# Patient Record
Sex: FEMALE | Race: WHITE | Employment: OTHER | URBAN - METROPOLITAN AREA
[De-identification: names, ages, dates, MRNs, and addresses within clinical notes are randomized per-mention and may not be internally consistent; named-entity substitution may affect disease eponyms.]

---

## 2017-08-28 ENCOUNTER — OFFICE VISIT (OUTPATIENT)
Dept: CARDIOLOGY CLINIC | Age: 57
End: 2017-08-28

## 2017-08-28 VITALS
WEIGHT: 137 LBS | BODY MASS INDEX: 20.76 KG/M2 | HEIGHT: 68 IN | HEART RATE: 66 BPM | DIASTOLIC BLOOD PRESSURE: 82 MMHG | OXYGEN SATURATION: 96 % | SYSTOLIC BLOOD PRESSURE: 128 MMHG

## 2017-08-28 DIAGNOSIS — I10 ESSENTIAL HYPERTENSION: ICD-10-CM

## 2017-08-28 DIAGNOSIS — R94.31 ABNORMAL EKG: ICD-10-CM

## 2017-08-28 DIAGNOSIS — I25.9 CHEST PAIN DUE TO MYOCARDIAL ISCHEMIA, UNSPECIFIED ISCHEMIC CHEST PAIN TYPE: ICD-10-CM

## 2017-08-28 PROCEDURE — 99205 OFFICE O/P NEW HI 60 MIN: CPT | Performed by: INTERNAL MEDICINE

## 2017-08-28 PROCEDURE — 93000 ELECTROCARDIOGRAM COMPLETE: CPT | Performed by: INTERNAL MEDICINE

## 2017-08-28 RX ORDER — M-VIT,TX,IRON,MINS/CALC/FOLIC 27MG-0.4MG
1 TABLET ORAL DAILY
COMMUNITY
End: 2021-12-20

## 2017-08-28 RX ORDER — ALPRAZOLAM 0.25 MG/1
0.25 TABLET ORAL PRN
Status: ON HOLD | COMMUNITY
End: 2019-02-26

## 2017-08-28 ASSESSMENT — ENCOUNTER SYMPTOMS
WHEEZING: 0
EYE REDNESS: 0
COUGH: 0
BLOOD IN STOOL: 0
EYE PAIN: 0
ABDOMINAL PAIN: 0
SHORTNESS OF BREATH: 0
COLOR CHANGE: 0
CHEST TIGHTNESS: 0

## 2019-02-25 ENCOUNTER — HOSPITAL ENCOUNTER (OUTPATIENT)
Age: 59
Discharge: HOME | End: 2019-02-26
Attending: INTERNAL MEDICINE | Admitting: INTERNAL MEDICINE
Payer: COMMERCIAL

## 2019-02-25 PROBLEM — R07.9 CHEST PAIN: Status: ACTIVE | Noted: 2019-02-25

## 2019-02-25 LAB — TROPONIN: <0.01 NG/ML

## 2019-02-25 PROCEDURE — 6360000004 HC RX CONTRAST MEDICATION: Performed by: INTERNAL MEDICINE

## 2019-02-25 PROCEDURE — 99220 PR INITIAL OBSERVATION CARE/DAY 70 MINUTES: CPT | Performed by: INTERNAL MEDICINE

## 2019-02-25 PROCEDURE — 99152 MOD SED SAME PHYS/QHP 5/>YRS: CPT

## 2019-02-25 PROCEDURE — C1894 INTRO/SHEATH, NON-LASER: HCPCS

## 2019-02-25 PROCEDURE — 2709999900 HC NON-CHARGEABLE SUPPLY

## 2019-02-25 PROCEDURE — 93458 L HRT ARTERY/VENTRICLE ANGIO: CPT

## 2019-02-25 PROCEDURE — G0378 HOSPITAL OBSERVATION PER HR: HCPCS

## 2019-02-25 PROCEDURE — 84484 ASSAY OF TROPONIN QUANT: CPT

## 2019-02-25 PROCEDURE — 36415 COLL VENOUS BLD VENIPUNCTURE: CPT

## 2019-02-25 PROCEDURE — 6370000000 HC RX 637 (ALT 250 FOR IP): Performed by: INTERNAL MEDICINE

## 2019-02-25 PROCEDURE — C1769 GUIDE WIRE: HCPCS

## 2019-02-25 PROCEDURE — 2780000010 HC IMPLANT OTHER

## 2019-02-25 PROCEDURE — 99153 MOD SED SAME PHYS/QHP EA: CPT

## 2019-02-25 PROCEDURE — 85347 COAGULATION TIME ACTIVATED: CPT

## 2019-02-25 PROCEDURE — 93458 L HRT ARTERY/VENTRICLE ANGIO: CPT | Performed by: INTERNAL MEDICINE

## 2019-02-25 RX ORDER — HYDRALAZINE HYDROCHLORIDE 20 MG/ML
10 INJECTION INTRAMUSCULAR; INTRAVENOUS EVERY 10 MIN PRN
Status: DISCONTINUED | OUTPATIENT
Start: 2019-02-25 | End: 2019-02-26 | Stop reason: HOSPADM

## 2019-02-25 RX ORDER — ACETAMINOPHEN 325 MG/1
650 TABLET ORAL EVERY 4 HOURS PRN
Status: DISCONTINUED | OUTPATIENT
Start: 2019-02-25 | End: 2019-02-26 | Stop reason: HOSPADM

## 2019-02-25 RX ORDER — FLUTICASONE PROPIONATE 50 MCG
1 SPRAY, SUSPENSION (ML) NASAL DAILY
Status: DISCONTINUED | OUTPATIENT
Start: 2019-02-26 | End: 2019-02-26 | Stop reason: HOSPADM

## 2019-02-25 RX ORDER — ALPRAZOLAM 0.25 MG/1
0.25 TABLET ORAL PRN
Status: DISCONTINUED | OUTPATIENT
Start: 2019-02-25 | End: 2019-02-25 | Stop reason: SDUPTHER

## 2019-02-25 RX ORDER — ONDANSETRON 2 MG/ML
4 INJECTION INTRAMUSCULAR; INTRAVENOUS EVERY 6 HOURS PRN
Status: DISCONTINUED | OUTPATIENT
Start: 2019-02-25 | End: 2019-02-26 | Stop reason: HOSPADM

## 2019-02-25 RX ORDER — HYDROCODONE BITARTRATE AND ACETAMINOPHEN 5; 325 MG/1; MG/1
0.5 TABLET ORAL EVERY 6 HOURS PRN
Status: DISCONTINUED | OUTPATIENT
Start: 2019-02-25 | End: 2019-02-26 | Stop reason: HOSPADM

## 2019-02-25 RX ORDER — M-VIT,TX,IRON,MINS/CALC/FOLIC 27MG-0.4MG
1 TABLET ORAL DAILY
Status: DISCONTINUED | OUTPATIENT
Start: 2019-02-26 | End: 2019-02-26 | Stop reason: HOSPADM

## 2019-02-25 RX ORDER — LEVOTHYROXINE SODIUM 0.05 MG/1
50 TABLET ORAL DAILY
Status: DISCONTINUED | OUTPATIENT
Start: 2019-02-26 | End: 2019-02-26 | Stop reason: HOSPADM

## 2019-02-25 RX ORDER — CYCLOBENZAPRINE HCL 10 MG
10 TABLET ORAL NIGHTLY PRN
Status: DISCONTINUED | OUTPATIENT
Start: 2019-02-25 | End: 2019-02-26 | Stop reason: HOSPADM

## 2019-02-25 RX ORDER — DIAZEPAM 5 MG/1
5 TABLET ORAL 2 TIMES DAILY PRN
Status: DISCONTINUED | OUTPATIENT
Start: 2019-02-25 | End: 2019-02-26 | Stop reason: HOSPADM

## 2019-02-25 RX ORDER — ASPIRIN 81 MG/1
81 TABLET ORAL DAILY
Status: DISCONTINUED | OUTPATIENT
Start: 2019-02-26 | End: 2019-02-26 | Stop reason: HOSPADM

## 2019-02-25 RX ORDER — CARVEDILOL 6.25 MG/1
6.25 TABLET ORAL 2 TIMES DAILY WITH MEALS
Status: DISCONTINUED | OUTPATIENT
Start: 2019-02-25 | End: 2019-02-26 | Stop reason: HOSPADM

## 2019-02-25 RX ORDER — CETIRIZINE HYDROCHLORIDE 10 MG/1
10 TABLET ORAL DAILY
Status: DISCONTINUED | OUTPATIENT
Start: 2019-02-26 | End: 2019-02-26 | Stop reason: HOSPADM

## 2019-02-25 RX ADMIN — CARVEDILOL 6.25 MG: 6.25 TABLET, FILM COATED ORAL at 20:31

## 2019-02-25 RX ADMIN — IOPAMIDOL 54 ML: 755 INJECTION, SOLUTION INTRAVENOUS at 18:14

## 2019-02-25 RX ADMIN — CYCLOBENZAPRINE HYDROCHLORIDE 10 MG: 10 TABLET, FILM COATED ORAL at 20:32

## 2019-02-25 ASSESSMENT — PAIN SCALES - GENERAL: PAINLEVEL_OUTOF10: 0

## 2019-02-26 VITALS
BODY MASS INDEX: 21.08 KG/M2 | HEART RATE: 78 BPM | SYSTOLIC BLOOD PRESSURE: 157 MMHG | WEIGHT: 138.67 LBS | TEMPERATURE: 98.1 F | RESPIRATION RATE: 14 BRPM | OXYGEN SATURATION: 99 % | DIASTOLIC BLOOD PRESSURE: 92 MMHG

## 2019-02-26 PROBLEM — R07.89 OTHER CHEST PAIN: Status: ACTIVE | Noted: 2019-02-25

## 2019-02-26 LAB
A/G RATIO: 1.6 (ref 1.1–2.2)
ALBUMIN SERPL-MCNC: 4.1 G/DL (ref 3.4–5)
ALP BLD-CCNC: 36 U/L (ref 40–129)
ALT SERPL-CCNC: 20 U/L (ref 10–40)
ANION GAP SERPL CALCULATED.3IONS-SCNC: 13 MMOL/L (ref 3–16)
AST SERPL-CCNC: 26 U/L (ref 15–37)
BILIRUB SERPL-MCNC: 0.3 MG/DL (ref 0–1)
BUN BLDV-MCNC: 12 MG/DL (ref 7–20)
CALCIUM SERPL-MCNC: 9.2 MG/DL (ref 8.3–10.6)
CHLORIDE BLD-SCNC: 100 MMOL/L (ref 99–110)
CHOLESTEROL, TOTAL: 233 MG/DL (ref 0–199)
CO2: 26 MMOL/L (ref 21–32)
CREAT SERPL-MCNC: 0.6 MG/DL (ref 0.6–1.1)
GFR AFRICAN AMERICAN: >60
GFR NON-AFRICAN AMERICAN: >60
GLOBULIN: 2.6 G/DL
GLUCOSE BLD-MCNC: 98 MG/DL (ref 70–99)
HCT VFR BLD CALC: 40.2 % (ref 36–48)
HDLC SERPL-MCNC: 110 MG/DL (ref 40–60)
HEMOGLOBIN: 13.6 G/DL (ref 12–16)
LDL CHOLESTEROL CALCULATED: 111 MG/DL
LV EF: 58 %
LVEF MODALITY: NORMAL
MAGNESIUM: 2.2 MG/DL (ref 1.8–2.4)
MCH RBC QN AUTO: 32.4 PG (ref 26–34)
MCHC RBC AUTO-ENTMCNC: 33.9 G/DL (ref 31–36)
MCV RBC AUTO: 95.7 FL (ref 80–100)
PDW BLD-RTO: 13.3 % (ref 12.4–15.4)
PLATELET # BLD: 220 K/UL (ref 135–450)
PMV BLD AUTO: 8.3 FL (ref 5–10.5)
POTASSIUM REFLEX MAGNESIUM: 4 MMOL/L (ref 3.5–5.1)
RBC # BLD: 4.2 M/UL (ref 4–5.2)
SODIUM BLD-SCNC: 139 MMOL/L (ref 136–145)
TOTAL PROTEIN: 6.7 G/DL (ref 6.4–8.2)
TRIGL SERPL-MCNC: 61 MG/DL (ref 0–150)
TROPONIN: <0.01 NG/ML
VLDLC SERPL CALC-MCNC: 12 MG/DL
WBC # BLD: 6.2 K/UL (ref 4–11)

## 2019-02-26 PROCEDURE — 6370000000 HC RX 637 (ALT 250 FOR IP): Performed by: INTERNAL MEDICINE

## 2019-02-26 PROCEDURE — 80053 COMPREHEN METABOLIC PANEL: CPT

## 2019-02-26 PROCEDURE — 84484 ASSAY OF TROPONIN QUANT: CPT

## 2019-02-26 PROCEDURE — 36415 COLL VENOUS BLD VENIPUNCTURE: CPT

## 2019-02-26 PROCEDURE — 80061 LIPID PANEL: CPT

## 2019-02-26 PROCEDURE — 85027 COMPLETE CBC AUTOMATED: CPT

## 2019-02-26 PROCEDURE — 83735 ASSAY OF MAGNESIUM: CPT

## 2019-02-26 PROCEDURE — C8923 2D TTE W OR W/O FOL W/CON,CO: HCPCS

## 2019-02-26 PROCEDURE — 94760 N-INVAS EAR/PLS OXIMETRY 1: CPT

## 2019-02-26 PROCEDURE — G0378 HOSPITAL OBSERVATION PER HR: HCPCS

## 2019-02-26 PROCEDURE — 99239 HOSP IP/OBS DSCHRG MGMT >30: CPT | Performed by: INTERNAL MEDICINE

## 2019-02-26 RX ORDER — AMOXICILLIN AND CLAVULANATE POTASSIUM 875; 125 MG/1; MG/1
1 TABLET, FILM COATED ORAL 2 TIMES DAILY
COMMUNITY
Start: 2019-02-24 | End: 2019-03-06

## 2019-02-26 RX ORDER — CARVEDILOL 6.25 MG/1
6.25 TABLET ORAL 2 TIMES DAILY WITH MEALS
Qty: 60 TABLET | Refills: 3 | Status: SHIPPED | OUTPATIENT
Start: 2019-02-26 | End: 2019-03-07 | Stop reason: SDUPTHER

## 2019-02-26 RX ADMIN — CETIRIZINE HYDROCHLORIDE 10 MG: 10 TABLET, FILM COATED ORAL at 09:51

## 2019-02-26 RX ADMIN — LEVOTHYROXINE SODIUM 50 MCG: 50 TABLET ORAL at 09:51

## 2019-02-26 RX ADMIN — CARVEDILOL 6.25 MG: 6.25 TABLET, FILM COATED ORAL at 09:51

## 2019-02-26 RX ADMIN — MULTIPLE VITAMINS W/ MINERALS TAB 1 TABLET: TAB at 09:51

## 2019-02-26 RX ADMIN — ASPIRIN 81 MG: 81 TABLET, COATED ORAL at 09:51

## 2019-02-26 RX ADMIN — ACETAMINOPHEN 650 MG: 325 TABLET, FILM COATED ORAL at 00:29

## 2019-02-26 ASSESSMENT — PAIN SCALES - GENERAL
PAINLEVEL_OUTOF10: 0
PAINLEVEL_OUTOF10: 10

## 2019-03-01 LAB — POC ACT LR: 204 SEC

## 2019-03-01 ASSESSMENT — ENCOUNTER SYMPTOMS
EYE PAIN: 0
SHORTNESS OF BREATH: 0
EYE REDNESS: 0
COLOR CHANGE: 0
ABDOMINAL PAIN: 0
COUGH: 0
BLOOD IN STOOL: 0
CHEST TIGHTNESS: 0
WHEEZING: 0

## 2019-03-07 ENCOUNTER — OFFICE VISIT (OUTPATIENT)
Dept: CARDIOLOGY CLINIC | Age: 59
End: 2019-03-07
Payer: COMMERCIAL

## 2019-03-07 VITALS
HEART RATE: 76 BPM | SYSTOLIC BLOOD PRESSURE: 140 MMHG | OXYGEN SATURATION: 99 % | DIASTOLIC BLOOD PRESSURE: 100 MMHG | WEIGHT: 139.4 LBS | BODY MASS INDEX: 21.13 KG/M2 | HEIGHT: 68 IN

## 2019-03-07 DIAGNOSIS — I10 ESSENTIAL HYPERTENSION: ICD-10-CM

## 2019-03-07 DIAGNOSIS — R94.31 ABNORMAL EKG: ICD-10-CM

## 2019-03-07 DIAGNOSIS — R07.89 OTHER CHEST PAIN: Primary | ICD-10-CM

## 2019-03-07 PROCEDURE — 99214 OFFICE O/P EST MOD 30 MIN: CPT | Performed by: INTERNAL MEDICINE

## 2019-03-07 RX ORDER — CARVEDILOL 12.5 MG/1
12.5 TABLET ORAL 2 TIMES DAILY WITH MEALS
Qty: 180 TABLET | Refills: 3 | Status: SHIPPED | OUTPATIENT
Start: 2019-03-07 | End: 2019-03-11 | Stop reason: SDUPTHER

## 2019-03-11 RX ORDER — CARVEDILOL 12.5 MG/1
12.5 TABLET ORAL 2 TIMES DAILY WITH MEALS
Qty: 180 TABLET | Refills: 3 | Status: SHIPPED | OUTPATIENT
Start: 2019-03-11 | End: 2020-02-20

## 2020-02-07 ENCOUNTER — HOSPITAL ENCOUNTER (OUTPATIENT)
Age: 60
Setting detail: OUTPATIENT SURGERY
Discharge: HOME | End: 2020-02-07
Attending: INTERNAL MEDICINE | Admitting: INTERNAL MEDICINE
Payer: COMMERCIAL

## 2020-02-07 VITALS
DIASTOLIC BLOOD PRESSURE: 62 MMHG | RESPIRATION RATE: 18 BRPM | BODY MASS INDEX: 21.22 KG/M2 | SYSTOLIC BLOOD PRESSURE: 106 MMHG | HEART RATE: 74 BPM | WEIGHT: 140 LBS | OXYGEN SATURATION: 97 % | HEIGHT: 68 IN | TEMPERATURE: 97.3 F

## 2020-02-07 PROCEDURE — 6370000000 HC RX 637 (ALT 250 FOR IP): Performed by: INTERNAL MEDICINE

## 2020-02-07 PROCEDURE — 88305 TISSUE EXAM BY PATHOLOGIST: CPT

## 2020-02-07 PROCEDURE — 7100000011 HC PHASE II RECOVERY - ADDTL 15 MIN: Performed by: INTERNAL MEDICINE

## 2020-02-07 PROCEDURE — 99152 MOD SED SAME PHYS/QHP 5/>YRS: CPT | Performed by: INTERNAL MEDICINE

## 2020-02-07 PROCEDURE — 3609010300 HC COLONOSCOPY W/BIOPSY SINGLE/MULTIPLE: Performed by: INTERNAL MEDICINE

## 2020-02-07 PROCEDURE — 2709999900 HC NON-CHARGEABLE SUPPLY: Performed by: INTERNAL MEDICINE

## 2020-02-07 PROCEDURE — 6360000002 HC RX W HCPCS: Performed by: INTERNAL MEDICINE

## 2020-02-07 PROCEDURE — 7100000010 HC PHASE II RECOVERY - FIRST 15 MIN: Performed by: INTERNAL MEDICINE

## 2020-02-07 PROCEDURE — 3609012400 HC EGD TRANSORAL BIOPSY SINGLE/MULTIPLE: Performed by: INTERNAL MEDICINE

## 2020-02-07 PROCEDURE — 99153 MOD SED SAME PHYS/QHP EA: CPT | Performed by: INTERNAL MEDICINE

## 2020-02-07 PROCEDURE — 2500000003 HC RX 250 WO HCPCS: Performed by: INTERNAL MEDICINE

## 2020-02-07 PROCEDURE — 2580000003 HC RX 258: Performed by: INTERNAL MEDICINE

## 2020-02-07 RX ORDER — MEPERIDINE HYDROCHLORIDE 25 MG/ML
INJECTION INTRAMUSCULAR; INTRAVENOUS; SUBCUTANEOUS PRN
Status: DISCONTINUED | OUTPATIENT
Start: 2020-02-07 | End: 2020-02-07 | Stop reason: ALTCHOICE

## 2020-02-07 RX ORDER — MEPERIDINE HYDROCHLORIDE 50 MG/ML
INJECTION INTRAMUSCULAR; INTRAVENOUS; SUBCUTANEOUS PRN
Status: DISCONTINUED | OUTPATIENT
Start: 2020-02-07 | End: 2020-02-07 | Stop reason: ALTCHOICE

## 2020-02-07 RX ORDER — MIDAZOLAM HYDROCHLORIDE 1 MG/ML
INJECTION INTRAMUSCULAR; INTRAVENOUS PRN
Status: DISCONTINUED | OUTPATIENT
Start: 2020-02-07 | End: 2020-02-07 | Stop reason: ALTCHOICE

## 2020-02-07 RX ORDER — 0.9 % SODIUM CHLORIDE 0.9 %
500 INTRAVENOUS SOLUTION INTRAVENOUS ONCE
Status: COMPLETED | OUTPATIENT
Start: 2020-02-07 | End: 2020-02-07

## 2020-02-07 RX ADMIN — SODIUM CHLORIDE 500 ML: 9 INJECTION, SOLUTION INTRAVENOUS at 06:56

## 2020-02-07 ASSESSMENT — PAIN SCALES - WONG BAKER
WONGBAKER_NUMERICALRESPONSE: 0
WONGBAKER_NUMERICALRESPONSE: 4
WONGBAKER_NUMERICALRESPONSE: 0
WONGBAKER_NUMERICALRESPONSE: 4
WONGBAKER_NUMERICALRESPONSE: 2
WONGBAKER_NUMERICALRESPONSE: 0

## 2020-02-07 ASSESSMENT — PAIN - FUNCTIONAL ASSESSMENT: PAIN_FUNCTIONAL_ASSESSMENT: 0-10

## 2020-02-07 NOTE — OP NOTE
[address]               [address] [place], [address]                                OPERATIVE REPORT    PATIENT NAME: Pernell Anderson                     :        1960  MED REC NO:   [id number]                          ROOM:  ACCOUNT NO:   [de-identified]                           ADMIT DATE: 2020  PROVIDER:     Flory Velazquez MD    DATE OF PROCEDURE:  2020    INDICATIONS FOR PROCEDURE:  1. For evaluation of gastroesophageal reflux disease in this  69-year-old woman who complained of intermittent heart burn, to rule out  Turpin's esophagus. 2.  History of polyps. 3.  Family history of colon cancer and uterus cancer. SURGEON:  Flory Velazquez MD    DESCRIPTION OF PROCEDURE:  EGD:  With the patient in the left lateral position and after 25 mg of  Demerol, 11 mg of Versed and 25 mg of Benadryl IV, the Olympus video  endoscope was introduced into the esophagus and advanced towards the GE  junction. The esophagus was normal.  Stomach was carefully inspected. It was normal.  Biopsies were obtained for Helicobacter pylori. The  duodenum was normal.  Scope was then removed without complications. COLONOSCOPY:  The Olympus video colonoscope was then inserted into the  rectum and advanced throughout the redundant colon until we reached the  point that the patient could not tolerate the procedure despite adequate  sedation. The scope at this point was withdrawn and colon was  inspected, upon the withdrawal of the scope. No abnormality except for  small polyp noted in the sigmoid, measured about 0.75 cm. We removed it  with the biopsy forceps technique. The scope was then removed without  complications. We believe that we reached the proximal transverse colon  in this procedure. The patient was sent to recovery room in  satisfactory condition. IMPRESSION:  1. Normal esophagogastroduodenoscopy. 2.  Sigmoid polyp.   3.  The patient should have another colonoscopy with MAC anesthesia. EBL none    Thank you, Dr. Khanh Byrne. Damian Yuen MD    D: 02/07/2020 8:34:16       T: 02/07/2020 9:13:08     [email or web address]  Job#: [id number]     Doc#: [id number]    CC:   Gordon Flores MD

## 2020-02-20 RX ORDER — CARVEDILOL 12.5 MG/1
TABLET ORAL
Qty: 180 TABLET | Refills: 4 | Status: SHIPPED | OUTPATIENT
Start: 2020-02-20 | End: 2021-02-22

## 2020-02-25 ENCOUNTER — ANESTHESIA (OUTPATIENT)
Dept: ENDOSCOPY | Age: 60
End: 2020-02-25
Payer: COMMERCIAL

## 2020-02-25 ENCOUNTER — ANESTHESIA EVENT (OUTPATIENT)
Dept: ENDOSCOPY | Age: 60
End: 2020-02-25
Payer: COMMERCIAL

## 2020-02-25 ENCOUNTER — HOSPITAL ENCOUNTER (OUTPATIENT)
Age: 60
Setting detail: OUTPATIENT SURGERY
Discharge: HOME | End: 2020-02-25
Attending: INTERNAL MEDICINE | Admitting: INTERNAL MEDICINE
Payer: COMMERCIAL

## 2020-02-25 VITALS
HEIGHT: 68 IN | OXYGEN SATURATION: 100 % | HEART RATE: 66 BPM | DIASTOLIC BLOOD PRESSURE: 92 MMHG | WEIGHT: 140 LBS | RESPIRATION RATE: 16 BRPM | TEMPERATURE: 97.5 F | SYSTOLIC BLOOD PRESSURE: 128 MMHG | BODY MASS INDEX: 21.22 KG/M2

## 2020-02-25 VITALS — DIASTOLIC BLOOD PRESSURE: 66 MMHG | SYSTOLIC BLOOD PRESSURE: 102 MMHG | OXYGEN SATURATION: 99 %

## 2020-02-25 PROCEDURE — 2500000003 HC RX 250 WO HCPCS: Performed by: INTERNAL MEDICINE

## 2020-02-25 PROCEDURE — 2580000003 HC RX 258: Performed by: INTERNAL MEDICINE

## 2020-02-25 PROCEDURE — 88305 TISSUE EXAM BY PATHOLOGIST: CPT

## 2020-02-25 PROCEDURE — 2709999900 HC NON-CHARGEABLE SUPPLY: Performed by: INTERNAL MEDICINE

## 2020-02-25 PROCEDURE — 7100000011 HC PHASE II RECOVERY - ADDTL 15 MIN: Performed by: INTERNAL MEDICINE

## 2020-02-25 PROCEDURE — 7100000010 HC PHASE II RECOVERY - FIRST 15 MIN: Performed by: INTERNAL MEDICINE

## 2020-02-25 PROCEDURE — 3609010600 HC COLONOSCOPY POLYPECTOMY SNARE/COLD BIOPSY: Performed by: INTERNAL MEDICINE

## 2020-02-25 PROCEDURE — 3700000000 HC ANESTHESIA ATTENDED CARE: Performed by: INTERNAL MEDICINE

## 2020-02-25 PROCEDURE — 6360000002 HC RX W HCPCS: Performed by: NURSE ANESTHETIST, CERTIFIED REGISTERED

## 2020-02-25 PROCEDURE — 3700000001 HC ADD 15 MINUTES (ANESTHESIA): Performed by: INTERNAL MEDICINE

## 2020-02-25 RX ORDER — SODIUM CHLORIDE, SODIUM LACTATE, POTASSIUM CHLORIDE, CALCIUM CHLORIDE 600; 310; 30; 20 MG/100ML; MG/100ML; MG/100ML; MG/100ML
INJECTION, SOLUTION INTRAVENOUS CONTINUOUS
Status: DISCONTINUED | OUTPATIENT
Start: 2020-02-25 | End: 2020-02-25 | Stop reason: HOSPADM

## 2020-02-25 RX ORDER — LIDOCAINE HYDROCHLORIDE 20 MG/ML
INJECTION, SOLUTION INTRAVENOUS PRN
Status: DISCONTINUED | OUTPATIENT
Start: 2020-02-25 | End: 2020-02-25 | Stop reason: SDUPTHER

## 2020-02-25 RX ORDER — PROPOFOL 10 MG/ML
INJECTION, EMULSION INTRAVENOUS PRN
Status: DISCONTINUED | OUTPATIENT
Start: 2020-02-25 | End: 2020-02-25 | Stop reason: SDUPTHER

## 2020-02-25 RX ORDER — SODIUM CHLORIDE 9 MG/ML
INJECTION, SOLUTION INTRAVENOUS CONTINUOUS
Status: DISCONTINUED | OUTPATIENT
Start: 2020-02-25 | End: 2020-02-25 | Stop reason: HOSPADM

## 2020-02-25 RX ADMIN — LIDOCAINE HYDROCHLORIDE 100 MG: 20 INJECTION, SOLUTION INTRAVENOUS at 09:36

## 2020-02-25 RX ADMIN — SODIUM CHLORIDE, POTASSIUM CHLORIDE, SODIUM LACTATE AND CALCIUM CHLORIDE: 600; 310; 30; 20 INJECTION, SOLUTION INTRAVENOUS at 07:42

## 2020-02-25 RX ADMIN — PROPOFOL 100 MG: 10 INJECTION, EMULSION INTRAVENOUS at 09:57

## 2020-02-25 RX ADMIN — PROPOFOL 100 MG: 10 INJECTION, EMULSION INTRAVENOUS at 09:38

## 2020-02-25 RX ADMIN — PROPOFOL 100 MG: 10 INJECTION, EMULSION INTRAVENOUS at 09:36

## 2020-02-25 RX ADMIN — PROPOFOL 100 MG: 10 INJECTION, EMULSION INTRAVENOUS at 09:50

## 2020-02-25 RX ADMIN — SODIUM CHLORIDE: 9 INJECTION, SOLUTION INTRAVENOUS at 10:11

## 2020-02-25 RX ADMIN — PROPOFOL 50 MG: 10 INJECTION, EMULSION INTRAVENOUS at 10:03

## 2020-02-25 RX ADMIN — PROPOFOL 100 MG: 10 INJECTION, EMULSION INTRAVENOUS at 09:41

## 2020-02-25 ASSESSMENT — PULMONARY FUNCTION TESTS
PIF_VALUE: 0
PIF_VALUE: 1
PIF_VALUE: 0

## 2020-02-25 ASSESSMENT — PAIN DESCRIPTION - PAIN TYPE: TYPE: ACUTE PAIN

## 2020-02-25 ASSESSMENT — PAIN - FUNCTIONAL ASSESSMENT
PAIN_FUNCTIONAL_ASSESSMENT: 0-10

## 2020-02-25 ASSESSMENT — PAIN DESCRIPTION - DESCRIPTORS: DESCRIPTORS: ACHING

## 2020-02-25 ASSESSMENT — PAIN DESCRIPTION - LOCATION: LOCATION: HEAD

## 2020-02-25 ASSESSMENT — LIFESTYLE VARIABLES: SMOKING_STATUS: 0

## 2020-02-25 ASSESSMENT — PAIN DESCRIPTION - ORIENTATION: ORIENTATION: ANTERIOR;OTHER (COMMENT)

## 2020-02-25 ASSESSMENT — PAIN SCALES - GENERAL: PAINLEVEL_OUTOF10: 4

## 2020-02-25 NOTE — H&P
History and Physical / Pre-Sedation Assessment    Patient:  Bud Evans   :   1960     Intended Procedure:  colonoscopy    HPI: h/o colon polyps. Fh of colon cancer and uterus cancer. She did not tolerate last colonoscopy . Today she is having colonoscopy with Mac anesthesia    Past Medical History:   has a past medical history of Anxiety, Fibromyalgia, Fibromyalgia, Hypertension, Hypotension, Hypothyroidism, and Irregular heart rate. Past Surgical History:   has a past surgical history that includes Tubal ligation; Ovarian cyst surgery; Tonsillectomy ([id number]); Cardiac catheterization (); Upper gastrointestinal endoscopy (N/A, 2020); and Colonoscopy (N/A, 2020). Medications:  Prior to Admission medications    Medication Sig Start Date End Date Taking? Authorizing Provider   carvedilol (COREG) 12.5 MG tablet TAKE 1 TABLET TWICE A DAY WITH MEALS 20  Yes Humberto Mar MD   Omega-3 Fatty Acids (FISH OIL CONCENTRATE PO) Take by mouth   Yes Historical Provider, MD   Multiple Vitamins-Minerals (THERAPEUTIC MULTIVITAMIN-MINERALS) tablet Take 1 tablet by mouth daily   Yes Historical Provider, MD   Ascorbic Acid (VITAMIN C IMMUNE HEALTH PO) Take by mouth   Yes Historical Provider, MD   Coenzyme Q10 (COQ10 PO) Take by mouth   Yes Historical Provider, MD   levothyroxine (SYNTHROID) 50 MCG tablet Take 50 mcg by mouth Daily   Yes Historical Provider, MD   cyclobenzaprine (FLEXERIL) 10 MG tablet Take 10 mg by mouth nightly as needed for Muscle spasms   Yes Historical Provider, MD   HYDROcodone-acetaminophen (NORCO) 5-325 MG per tablet Take 0.5 tablets by mouth as needed for Pain  .    Yes Historical Provider, MD   diazepam (VALIUM) 5 MG tablet Take 5 mg by mouth as needed for Anxiety   Yes Historical Provider, MD   fluticasone (FLONASE) 50 MCG/ACT nasal spray 1 spray by Nasal route daily   Yes Historical Provider, MD   cetirizine (ZYRTEC) 10 MG tablet Take 10 mg by mouth daily   Yes Historical Provider, MD       Family History:  family history includes Dementia in her mother; High Blood Pressure in her mother. Social History:   reports that she has never smoked. She has never used smokeless tobacco. She reports current alcohol use. She reports that she does not use drugs. Allergies:  Cefdinir; Food; Tylenol with codeine #3 [acetaminophen-codeine]; and Codeine    ROS:  twelve point system review was unremarkable except for above noted history. Nurses notes reviewed and agreed. Medications reviewed    Physical Exam:  Vital Signs: /78   Pulse 62   Temp 97.5 °F (36.4 °C) (Oral)   Resp 16   Ht 5' 8\" (1.727 m)   Wt 140 lb (63.5 kg)   SpO2 100%   BMI 21.29 kg/m²    Skin: normal  HEENT: normal  Neck: supple. No adenopathy. No thyromegaly. No JVD. Pulmonary:Normal  Cardiac:Normal  Abdomen:Normal  MS: normal  Neuro: normal  Ext: no edema. Pulses normal    Pre-Procedure Assessment / Plan:  ASA 2 - Patient with mild systemic disease with no functional limitations  Mallampati Airway Assessment:  Mallampati Class I - (soft palate, fauces, uvula & anterior/posterior tonsillar pillars are visible)  Level of Sedation Plan: Moderate sedation  Post Procedure plan: Return to same level of care    I assessed the patient and find that the patient is in satisfactory condition to proceed with the planned procedure and sedation plan. I have explained the risk, benefits, and alternatives to the procedure. The patient understands and agrees to proceed.   Shaista Bess  9:08 AM 2/25/2020

## 2020-02-25 NOTE — ANESTHESIA PRE PROCEDURE
(See Comments)    Food      avacodos    Tylenol With Codeine #3 [Acetaminophen-Codeine] Other (See Comments)     hallicunations    Codeine Other (See Comments)     HALLUCINATIONS  HALLUCINATIONS         Problem List:    Patient Active Problem List   Diagnosis Code    Chest pain due to myocardial ischemia I25.9    Essential hypertension I10    Abnormal EKG R94.31    Other chest pain R07.89       Past Medical History:        Diagnosis Date    Anxiety     Fibromyalgia     Fibromyalgia     Hypertension     Hypotension     Hypothyroidism     Irregular heart rate        Past Surgical History:        Procedure Laterality Date    CARDIAC CATHETERIZATION  2019    COLONOSCOPY N/A 2/7/2020    COLONOSCOPY WITH BIOPSY performed by Monico Pleitez MD at [address] [facility]  [id number]    adenoidectomy    TUBAL LIGATION      UPPER GASTROINTESTINAL ENDOSCOPY N/A 2/7/2020    EGD BIOPSY performed by Monico Pleitez MD at [address] [facility] History:    Social History     Tobacco Use    Smoking status: Never Smoker    Smokeless tobacco: Never Used   Substance Use Topics    Alcohol use: Yes     Comment: occasional                                Counseling given: Not Answered      Vital Signs (Current):   Vitals:    02/25/20 0723   BP: 137/78   Pulse: 62   Resp: 16   Temp: 97.5 °F (36.4 °C)   TempSrc: Oral   SpO2: 100%   Weight: 140 lb (63.5 kg)   Height: 5' 8\" (1.727 m)                                              BP Readings from Last 3 Encounters:   02/25/20 137/78   02/07/20 106/62   03/07/19 (!) 140/100       NPO Status: Time of last liquid consumption: 2300                        Time of last solid consumption: 2100                        Date of last liquid consumption: 02/24/20                        Date of last solid food consumption: 02/23/20    BMI:   Wt Readings from Last 3 Encounters:   02/25/20 140 lb (63.5 kg)   02/07/20 140 lb (63.5 kg)   03/07/19 139 lb 6.4

## 2020-02-25 NOTE — ANESTHESIA POSTPROCEDURE EVALUATION
Department of Anesthesiology  Postprocedure Note    Patient: Paul Max  MRN: [id number]  YOB: 1960  Date of evaluation: 2/25/2020  Time:  11:26 AM     Procedure Summary     Date:  02/25/20 Room / Location:  [facility] [address] / [facility]; [place] [address]    Anesthesia Start:  0932 Anesthesia Stop:  1011    Procedures:       COLONOSCOPY PROCEDURE      COLONOSCOPY POLYPECTOMY SNARE/COLD BIOPSY Diagnosis:  (Screening)    Scheduled Providers: Wilfredo Rodríguez MD Responsible Provider:  Obi Naylor DO    Anesthesia Type:  MAC ASA Status:  2          Anesthesia Type: MAC    Roberto Carlos Phase I: Roberto Carlos Score: 10    Roberto Carlos Phase II: Roberto Carlos Score: 10    Last vitals: Reviewed and per EMR flowsheets.        Anesthesia Post Evaluation    Patient location during evaluation: PACU  Patient participation: complete - patient participated  Level of consciousness: awake and alert  Pain score: 0  Airway patency: patent  Nausea & Vomiting: no nausea and no vomiting  Complications: no  Cardiovascular status: hemodynamically stable  Respiratory status: acceptable  Hydration status: stable

## 2020-11-29 NOTE — PROGRESS NOTES
Ambulatory Surgery/Procedure Discharge Note    Vitals:    02/25/20 1052   BP: (!) 128/92   Pulse: 66   Resp:    Temp:    SpO2: 100%       In: 1630 [P.O.:480; I.V.:1150]  Out: -     Restroom use offered before discharge. Yes    Pain assessment:  none  Pain Level: 4        Patient discharged to home/self care.  Patient discharged via wheel chair by transporter to waiting family/S.O.       2/25/2020 10:54 AM
home w/ home PT

## 2021-02-12 ENCOUNTER — APPOINTMENT (OUTPATIENT)
Dept: GENERAL RADIOLOGY | Age: 61
DRG: 310 | End: 2021-02-12
Payer: COMMERCIAL

## 2021-02-12 ENCOUNTER — HOSPITAL ENCOUNTER (INPATIENT)
Age: 61
LOS: 2 days | Discharge: HOME | DRG: 310 | End: 2021-02-14
Attending: EMERGENCY MEDICINE | Admitting: INTERNAL MEDICINE
Payer: COMMERCIAL

## 2021-02-12 DIAGNOSIS — M79.7 FIBROMYALGIA: ICD-10-CM

## 2021-02-12 DIAGNOSIS — R79.89 ELEVATED BRAIN NATRIURETIC PEPTIDE (BNP) LEVEL: ICD-10-CM

## 2021-02-12 DIAGNOSIS — I48.92 ATRIAL FLUTTER, UNSPECIFIED TYPE (HCC): Primary | ICD-10-CM

## 2021-02-12 PROBLEM — I48.91 FLUTTER-FIBRILLATION (HCC): Status: ACTIVE | Noted: 2021-02-12

## 2021-02-12 PROBLEM — I48.91 A-FIB (HCC): Status: ACTIVE | Noted: 2021-02-12

## 2021-02-12 LAB
A/G RATIO: 1.2 (ref 1.1–2.2)
ALBUMIN SERPL-MCNC: 4 G/DL (ref 3.4–5)
ALP BLD-CCNC: 45 U/L (ref 40–129)
ALT SERPL-CCNC: 16 U/L (ref 10–40)
ANION GAP SERPL CALCULATED.3IONS-SCNC: 13 MMOL/L (ref 3–16)
AST SERPL-CCNC: 25 U/L (ref 15–37)
BASOPHILS ABSOLUTE: 0.1 K/UL (ref 0–0.2)
BASOPHILS RELATIVE PERCENT: 1.1 %
BILIRUB SERPL-MCNC: <0.2 MG/DL (ref 0–1)
BUN BLDV-MCNC: 16 MG/DL (ref 7–20)
CALCIUM SERPL-MCNC: 9.8 MG/DL (ref 8.3–10.6)
CHLORIDE BLD-SCNC: 97 MMOL/L (ref 99–110)
CO2: 24 MMOL/L (ref 21–32)
CREAT SERPL-MCNC: 0.6 MG/DL (ref 0.6–1.2)
EKG ATRIAL RATE: 357 BPM
EKG DIAGNOSIS: NORMAL
EKG Q-T INTERVAL: 298 MS
EKG QRS DURATION: 76 MS
EKG QTC CALCULATION (BAZETT): 419 MS
EKG R AXIS: 57 DEGREES
EKG T AXIS: 49 DEGREES
EKG VENTRICULAR RATE: 119 BPM
EOSINOPHILS ABSOLUTE: 0.1 K/UL (ref 0–0.6)
EOSINOPHILS RELATIVE PERCENT: 1.9 %
GFR AFRICAN AMERICAN: >60
GFR NON-AFRICAN AMERICAN: >60
GLOBULIN: 3.3 G/DL
GLUCOSE BLD-MCNC: 100 MG/DL (ref 70–99)
HCT VFR BLD CALC: 41.4 % (ref 36–48)
HEMOGLOBIN: 13.6 G/DL (ref 12–16)
LYMPHOCYTES ABSOLUTE: 1.6 K/UL (ref 1–5.1)
LYMPHOCYTES RELATIVE PERCENT: 25.6 %
MCH RBC QN AUTO: 30 PG (ref 26–34)
MCHC RBC AUTO-ENTMCNC: 33 G/DL (ref 31–36)
MCV RBC AUTO: 91.1 FL (ref 80–100)
MONOCYTES ABSOLUTE: 0.6 K/UL (ref 0–1.3)
MONOCYTES RELATIVE PERCENT: 9.3 %
NEUTROPHILS ABSOLUTE: 3.9 K/UL (ref 1.7–7.7)
NEUTROPHILS RELATIVE PERCENT: 62.1 %
PDW BLD-RTO: 14.5 % (ref 12.4–15.4)
PLATELET # BLD: 339 K/UL (ref 135–450)
PMV BLD AUTO: 8.2 FL (ref 5–10.5)
POTASSIUM REFLEX MAGNESIUM: 4.4 MMOL/L (ref 3.5–5.1)
PRO-BNP: 1866 PG/ML (ref 0–124)
RBC # BLD: 4.54 M/UL (ref 4–5.2)
SODIUM BLD-SCNC: 134 MMOL/L (ref 136–145)
TOTAL PROTEIN: 7.3 G/DL (ref 6.4–8.2)
TROPONIN: <0.01 NG/ML
TSH REFLEX: 1.76 UIU/ML (ref 0.27–4.2)
WBC # BLD: 6.3 K/UL (ref 4–11)

## 2021-02-12 PROCEDURE — 80053 COMPREHEN METABOLIC PANEL: CPT

## 2021-02-12 PROCEDURE — 93010 ELECTROCARDIOGRAM REPORT: CPT | Performed by: INTERNAL MEDICINE

## 2021-02-12 PROCEDURE — 84484 ASSAY OF TROPONIN QUANT: CPT

## 2021-02-12 PROCEDURE — 6370000000 HC RX 637 (ALT 250 FOR IP): Performed by: INTERNAL MEDICINE

## 2021-02-12 PROCEDURE — 85025 COMPLETE CBC W/AUTO DIFF WBC: CPT

## 2021-02-12 PROCEDURE — 96374 THER/PROPH/DIAG INJ IV PUSH: CPT

## 2021-02-12 PROCEDURE — 2580000003 HC RX 258: Performed by: PHYSICIAN ASSISTANT

## 2021-02-12 PROCEDURE — 2060000000 HC ICU INTERMEDIATE R&B

## 2021-02-12 PROCEDURE — 2500000003 HC RX 250 WO HCPCS: Performed by: INTERNAL MEDICINE

## 2021-02-12 PROCEDURE — 93005 ELECTROCARDIOGRAM TRACING: CPT | Performed by: PHYSICIAN ASSISTANT

## 2021-02-12 PROCEDURE — 99283 EMERGENCY DEPT VISIT LOW MDM: CPT

## 2021-02-12 PROCEDURE — 83880 ASSAY OF NATRIURETIC PEPTIDE: CPT

## 2021-02-12 PROCEDURE — 71046 X-RAY EXAM CHEST 2 VIEWS: CPT

## 2021-02-12 PROCEDURE — 2500000003 HC RX 250 WO HCPCS: Performed by: PHYSICIAN ASSISTANT

## 2021-02-12 PROCEDURE — 84443 ASSAY THYROID STIM HORMONE: CPT

## 2021-02-12 RX ORDER — LABETALOL HYDROCHLORIDE 5 MG/ML
10 INJECTION, SOLUTION INTRAVENOUS EVERY 4 HOURS PRN
Status: DISCONTINUED | OUTPATIENT
Start: 2021-02-12 | End: 2021-02-14 | Stop reason: HOSPADM

## 2021-02-12 RX ORDER — LEVOTHYROXINE SODIUM 0.05 MG/1
50 TABLET ORAL DAILY
Status: DISCONTINUED | OUTPATIENT
Start: 2021-02-13 | End: 2021-02-14 | Stop reason: HOSPADM

## 2021-02-12 RX ORDER — M-VIT,TX,IRON,MINS/CALC/FOLIC 27MG-0.4MG
1 TABLET ORAL DAILY
Status: DISCONTINUED | OUTPATIENT
Start: 2021-02-13 | End: 2021-02-14 | Stop reason: HOSPADM

## 2021-02-12 RX ORDER — CYCLOBENZAPRINE HCL 10 MG
10 TABLET ORAL NIGHTLY PRN
Status: DISCONTINUED | OUTPATIENT
Start: 2021-02-12 | End: 2021-02-14 | Stop reason: HOSPADM

## 2021-02-12 RX ORDER — DILTIAZEM HYDROCHLORIDE 5 MG/ML
20 INJECTION INTRAVENOUS ONCE
Status: COMPLETED | OUTPATIENT
Start: 2021-02-12 | End: 2021-02-12

## 2021-02-12 RX ORDER — POLYETHYLENE GLYCOL 3350 17 G/17G
17 POWDER, FOR SOLUTION ORAL NIGHTLY
COMMUNITY

## 2021-02-12 RX ORDER — VITAMIN B COMPLEX
2000 TABLET ORAL DAILY
Status: DISCONTINUED | OUTPATIENT
Start: 2021-02-13 | End: 2021-02-14 | Stop reason: HOSPADM

## 2021-02-12 RX ORDER — MAGNESIUM SULFATE IN WATER 40 MG/ML
2000 INJECTION, SOLUTION INTRAVENOUS PRN
Status: DISCONTINUED | OUTPATIENT
Start: 2021-02-12 | End: 2021-02-14 | Stop reason: HOSPADM

## 2021-02-12 RX ORDER — SODIUM CHLORIDE 0.9 % (FLUSH) 0.9 %
10 SYRINGE (ML) INJECTION EVERY 12 HOURS SCHEDULED
Status: DISCONTINUED | OUTPATIENT
Start: 2021-02-12 | End: 2021-02-13 | Stop reason: SDUPTHER

## 2021-02-12 RX ORDER — ACETAMINOPHEN 650 MG/1
650 SUPPOSITORY RECTAL EVERY 6 HOURS PRN
Status: DISCONTINUED | OUTPATIENT
Start: 2021-02-12 | End: 2021-02-14 | Stop reason: HOSPADM

## 2021-02-12 RX ORDER — UBIDECARENONE 30 MG
30 CAPSULE ORAL DAILY
Status: DISCONTINUED | OUTPATIENT
Start: 2021-02-12 | End: 2021-02-12

## 2021-02-12 RX ORDER — POLYETHYLENE GLYCOL 3350 17 G/17G
17 POWDER, FOR SOLUTION ORAL NIGHTLY
Status: DISCONTINUED | OUTPATIENT
Start: 2021-02-12 | End: 2021-02-14 | Stop reason: HOSPADM

## 2021-02-12 RX ORDER — CETIRIZINE HYDROCHLORIDE 10 MG/1
10 TABLET ORAL DAILY
Status: DISCONTINUED | OUTPATIENT
Start: 2021-02-13 | End: 2021-02-14 | Stop reason: HOSPADM

## 2021-02-12 RX ORDER — SODIUM CHLORIDE 0.9 % (FLUSH) 0.9 %
10 SYRINGE (ML) INJECTION PRN
Status: DISCONTINUED | OUTPATIENT
Start: 2021-02-12 | End: 2021-02-13 | Stop reason: SDUPTHER

## 2021-02-12 RX ORDER — MULTIVIT-MIN/IRON/FOLIC ACID/K 18-600-40
2000 CAPSULE ORAL DAILY
COMMUNITY

## 2021-02-12 RX ORDER — SWAB
1 SWAB, NON-MEDICATED MISCELLANEOUS DAILY
Status: DISCONTINUED | OUTPATIENT
Start: 2021-02-12 | End: 2021-02-12

## 2021-02-12 RX ORDER — ACETAMINOPHEN 325 MG/1
650 TABLET ORAL EVERY 6 HOURS PRN
Status: DISCONTINUED | OUTPATIENT
Start: 2021-02-12 | End: 2021-02-14 | Stop reason: HOSPADM

## 2021-02-12 RX ORDER — POTASSIUM CHLORIDE 7.45 MG/ML
10 INJECTION INTRAVENOUS PRN
Status: DISCONTINUED | OUTPATIENT
Start: 2021-02-12 | End: 2021-02-14 | Stop reason: HOSPADM

## 2021-02-12 RX ORDER — CARVEDILOL 12.5 MG/1
12.5 TABLET ORAL 2 TIMES DAILY WITH MEALS
Status: DISCONTINUED | OUTPATIENT
Start: 2021-02-12 | End: 2021-02-14 | Stop reason: HOSPADM

## 2021-02-12 RX ORDER — PROMETHAZINE HYDROCHLORIDE 25 MG/1
12.5 TABLET ORAL EVERY 6 HOURS PRN
Status: DISCONTINUED | OUTPATIENT
Start: 2021-02-12 | End: 2021-02-14 | Stop reason: HOSPADM

## 2021-02-12 RX ORDER — ASCORBIC ACID 500 MG
500 TABLET ORAL DAILY
Status: DISCONTINUED | OUTPATIENT
Start: 2021-02-13 | End: 2021-02-14 | Stop reason: HOSPADM

## 2021-02-12 RX ORDER — ONDANSETRON 2 MG/ML
4 INJECTION INTRAMUSCULAR; INTRAVENOUS EVERY 6 HOURS PRN
Status: DISCONTINUED | OUTPATIENT
Start: 2021-02-12 | End: 2021-02-14 | Stop reason: HOSPADM

## 2021-02-12 RX ORDER — FLUTICASONE PROPIONATE 50 MCG
1 SPRAY, SUSPENSION (ML) NASAL DAILY PRN
Status: DISCONTINUED | OUTPATIENT
Start: 2021-02-12 | End: 2021-02-14 | Stop reason: HOSPADM

## 2021-02-12 RX ORDER — DIAZEPAM 5 MG/1
5 TABLET ORAL PRN
Status: DISCONTINUED | OUTPATIENT
Start: 2021-02-12 | End: 2021-02-14 | Stop reason: HOSPADM

## 2021-02-12 RX ADMIN — DILTIAZEM HYDROCHLORIDE 20 MG: 5 INJECTION INTRAVENOUS at 13:52

## 2021-02-12 RX ADMIN — DILTIAZEM HYDROCHLORIDE 5 MG/HR: 5 INJECTION INTRAVENOUS at 13:55

## 2021-02-12 RX ADMIN — LABETALOL HYDROCHLORIDE 10 MG: 5 INJECTION, SOLUTION INTRAVENOUS at 15:36

## 2021-02-12 RX ADMIN — POLYETHYLENE GLYCOL 3350 17 G: 17 POWDER, FOR SOLUTION ORAL at 20:18

## 2021-02-12 RX ADMIN — CYCLOBENZAPRINE 10 MG: 10 TABLET, FILM COATED ORAL at 22:20

## 2021-02-12 ASSESSMENT — ENCOUNTER SYMPTOMS
SHORTNESS OF BREATH: 1
GASTROINTESTINAL NEGATIVE: 1
CHEST TIGHTNESS: 1
EYES NEGATIVE: 1

## 2021-02-12 ASSESSMENT — PAIN DESCRIPTION - PAIN TYPE: TYPE: ACUTE PAIN

## 2021-02-12 ASSESSMENT — PAIN SCALES - GENERAL
PAINLEVEL_OUTOF10: 1
PAINLEVEL_OUTOF10: 5

## 2021-02-12 ASSESSMENT — PAIN DESCRIPTION - ORIENTATION: ORIENTATION: MID

## 2021-02-12 ASSESSMENT — PAIN DESCRIPTION - LOCATION: LOCATION: CHEST

## 2021-02-12 NOTE — ED NOTES
Pharmacy Medication Reconciliation Note     List of medications patient is currently taking is complete. Source of information:   1. patient  2. EMR    Notes regarding home medications:   1. Reports taking her AM medications and supplements today  2. Will be establishing care with a new PCP in near future to discuss further use of diazepam and cyclobenzaprine. 3. No longer using Norco for pain control     Denies taking any other OTC or herbal medications      Reviewed new medications started during hospital admission: diltiazem. Indications and side effects were emphasized during counseling. All medication-related questions addressed. Patient verbalized understanding of education. Should the patient express any additional questions or concerns regarding their medications, please do not hesitate to contact the pharmacy department. Patient/caregiver aware they may refuse medications during hospital stay. 20 minutes spent educating patient regarding medications.       Hilton Epley, PharmD, BCPS  2/12/2021  4:42 PM

## 2021-02-12 NOTE — H&P
Hospital Medicine History & Physical      PCP: Beronica Steven MD    Date of Admission: 2/12/2021    Chief Complaint: Palpitations, chest pain    History Of Present Illness:  Patient is a 50-year-old female with past medical history of hypertension, anxiety, fibromyalgia, hypothyroidism who presents to the hospital for palpitations, chest pain chest tightness and exertional shortness of breath. According to the patient she recently had upper respiratory tract infection which was treated however she was tested for COVID-19 3 times in the past and has been negative including recently. Patient mentions she has feeling of chest tightness and palpitations she feels like funny sensation in her chest.  Patient also feels short of breath denies orthopnea, PND. Patient denies fevers chills nausea vomiting diarrhea constipation dysuria. Past Medical History:          Diagnosis Date    Anxiety     Atrial fibrillation (HCC)     Fibromyalgia     Fibromyalgia     Hypertension     Hypotension     Hypothyroidism     Irregular heart rate        Past Surgical History:          Procedure Laterality Date    CARDIAC CATHETERIZATION  2019    COLONOSCOPY N/A 2/7/2020    COLONOSCOPY WITH BIOPSY performed by Kaelyn Clark MD at [address]  2/25/2020    COLONOSCOPY POLYPECTOMY SNARE/COLD BIOPSY performed by Kaelyn Clark MD at [address] [facility]  [id number]    adenoidectomy    TUBAL LIGATION      UPPER GASTROINTESTINAL ENDOSCOPY N/A 2/7/2020    EGD BIOPSY performed by Kaelyn Clark MD at [facility]       Medications Prior to Admission:      Prior to Admission medications    Medication Sig Start Date End Date Taking?  Authorizing Provider   Cholecalciferol (VITAMIN D) 50 MCG (2000 UT) CAPS capsule Take 2,000 Units by mouth daily   Yes Historical Provider, MD   Turmeric 1053 MG TABS Take 1 tablet by mouth daily   Yes Historical Provider, MD   polyethylene glycol (GLYCOLAX) 17 g packet Take 17 g by mouth nightly   Yes Historical Provider, MD   carvedilol (COREG) 12.5 MG tablet TAKE 1 TABLET TWICE A DAY WITH MEALS 2/20/20  Yes Dawit Pimentel MD   Omega-3 Fatty Acids (FISH OIL CONCENTRATE PO) Take 1 capsule by mouth daily    Yes Historical Provider, MD   Multiple Vitamins-Minerals (THERAPEUTIC MULTIVITAMIN-MINERALS) tablet Take 1 tablet by mouth daily   Yes Historical Provider, MD   Ascorbic Acid (VITAMIN C IMMUNE HEALTH PO) Take 500 mg by mouth daily    Yes Historical Provider, MD   Coenzyme Q10 (COQ10 PO) Take 1 tablet by mouth daily    Yes Historical Provider, MD   levothyroxine (SYNTHROID) 50 MCG tablet Take 50 mcg by mouth Daily   Yes Historical Provider, MD   cetirizine (ZYRTEC) 10 MG tablet Take 10 mg by mouth daily   Yes Historical Provider, MD   cyclobenzaprine (FLEXERIL) 10 MG tablet Take 10 mg by mouth nightly as needed for Muscle spasms    Historical Provider, MD   diazepam (VALIUM) 5 MG tablet Take 5 mg by mouth as needed for Anxiety    Historical Provider, MD   fluticasone (FLONASE) 50 MCG/ACT nasal spray 1 spray by Nasal route daily as needed for Allergies     Historical Provider, MD       Allergies:  Cefdinir, Food, Tylenol with codeine #3 [acetaminophen-codeine], and Codeine    Social History:      TOBACCO:   reports that she has never smoked. She has never used smokeless tobacco.  ETOH:   reports current alcohol use. Family History:       Reviewed in detail and non contributory          Problem Relation Age of Onset    High Blood Pressure Mother     Dementia Mother        REVIEW OF SYSTEMS:   Pertinent positives as noted in the HPI. All other systems reviewed and negative. PHYSICAL EXAM PERFORMED:    BP (!) 137/95   Pulse 80   Temp 97.7 °F (36.5 °C) (Oral)   Resp 18   Ht 5' 8\" (1.727 m)   Wt 164 lb 0.4 oz (74.4 kg)   SpO2 97%   BMI 24.94 kg/m²     General appearance:  No apparent distress, cooperative.   HEENT:  Normal cephalic, atraumatic without obvious deformity. Conjunctivae/corneas clear. Neck: Supple, with full range of motion. No cervical lymphadenopathy  Respiratory:  Normal respiratory effort. Clear to auscultation, bilaterally without Rales/Wheezes/Rhonchi. Cardiovascular: Irregular rate and rhythm with normal S1/S2 without murmurs, rubs or gallops. Abdomen: Soft, non-tender, non-distended, normal bowel sounds. Musculoskeletal:  No edema noted bilaterally. No tenderness on palpation   Skin: no rash visible  Neurologic:  Neurologically intact without any focal sensory/motor deficits. grossly non-focal.  Psychiatric:  Alert and oriented, normal mood  Peripheral Pulses: +2 palpable, equal bilaterally       Labs:     Recent Labs     02/12/21  1257   WBC 6.3   HGB 13.6   HCT 41.4        Recent Labs     02/12/21  1257   *   K 4.4   CL 97*   CO2 24   BUN 16   CREATININE 0.6   CALCIUM 9.8     Recent Labs     02/12/21  1257   AST 25   ALT 16   BILITOT <0.2   ALKPHOS 45     No results for input(s): INR in the last 72 hours. Recent Labs     02/12/21  1257   TROPONINI <0.01       Urinalysis:    No results found for: Vince Agent, BACTERIA, RBCUA, BLOODU, SPECGRAV, GLUCOSEU    Radiology:       XR CHEST (2 VW)   Final Result   1. No acute abnormality. Active Hospital Problems    Diagnosis Date Noted    Flutter-fibrillation [facility]) [C21.49, I48.92] 02/12/2021   Ainsley Ugalde [facility]) [I48.91] 02/12/2021       Patient is a 67-year-old female with past medical history of hypertension, anxiety, fibromyalgia, hypothyroidism who presents to the hospital for palpitations, chest pain chest tightness and exertional shortness of breath. According to the patient she recently had upper respiratory tract infection which was treated however she was tested for COVID-19 3 times in the past and has been negative including recently.   Patient mentions she has feeling of chest tightness and palpitations she feels like funny sensation in her chest. Patient also feels short of breath denies orthopnea, PND. Patient denies fevers chills nausea vomiting diarrhea constipation dysuria. Assessment  New onset atrial fibrillation  Hypothyroidism  Fibromyalgia  Anxiety  Hypertension    Plan  Started on IV Cardizem, continue home Coreg, consult cardiology, monitor on cardiac telemetry, monitor and replace electrolytes  Oral anticoagulation per cardiology, started on therapeutic dose of Lovenox for A. fib  Resume home medications  DVT prophylaxis-Lovenox  TSH-within normal limits, check echocardiogram TTE  Diet: DIET GENERAL;  Code Status: Full Code    PT/OT Eval Status: ordered    Dispo - pending clinical improvement       Brittani Underwood MD    The note was completed using EMR and Dragon dictation system. Every effort was made to ensure accuracy; however, inadvertent computerized transcription errors may be present. Thank you Rubén Tomlinson MD for the opportunity to be involved in this patient's care. If you have any questions or concerns please feel free to contact me at [phone].     Brittani Underwood MD

## 2021-02-12 NOTE — PROGRESS NOTES
4 Eyes Skin Assessment     NAME:  Andreina Roa  YOB: 1960  MEDICAL RECORD NUMBER:  [id number]    The patient is being assess for  Admission    I agree that 2 RN's have performed a thorough Head to Toe Skin Assessment on the patient. ALL assessment sites listed below have been assessed. Areas assessed by both nurses:    Head, Face, Ears, Shoulders, Back, Chest, Arms, Elbows, Hands, Sacrum. Buttock, Coccyx, Ischium and Legs. Feet and Heels        Does the Patient have a Wound?  No noted wound(s)       [place] Prevention initiated:  No   Wound Care Orders initiated:  NA    Pressure Injury (Stage 3,4, Unstageable, DTI, NWPT, and Complex wounds) if present place consult order under [de-identified] NA    New and Established Ostomies if present place consult order under : NA      Nurse 1 eSignature: Electronically signed by Shimon Schumacher RN on 2/12/21 at 5:28 PM EST    **SHARE this note so that the co-signing nurse is able to place an eSignature**    Nurse 2 eSignature: Electronically signed by Monica Lackey RN on 2/12/2021 at 5:40 PM

## 2021-02-12 NOTE — ED PROVIDER NOTES
[facility] EMERGENCY DEPARTMENT  [address] [address]  Dept: [phone]  Loc: [phone]  eMERGENCYdEPARTMENT eNCOUnter      Pt Name: Tom Meyers  MRN: [id number]  [place] 1960  Date of evaluation: 2/12/2021  Provider:Fariba Oakley PA-C    CHIEF COMPLAINT       Chief Complaint   Patient presents with    Chest Pain     Since middle of January, has been on steroids for respiratory infection and states \"it makes it worse. \"       CRITICAL CARE TIME   Total Critical Care time was 32 minutes, excluding separately reportable procedures. There was a high probability of clinically significant/life threatening deterioration in the patient's condition which required my urgentintervention. HISTORY OF PRESENT ILLNESS  (Location/Symptom, Timing/Onset, Context/Setting, Quality, Duration,Modifying Factors, Severity.)   Tom Meyers is a 63 y.o. female with past medical history of hypertension who presents to the emergency department by private vehicle. Patient states since early January she has had intermittent episodes of fatigue, lightheadedness, chest tightness, shortness of breath, palpitations. Episodes have become more frequent and progressively worsening. Patient states over the past week she has been unable to complete her cardio workouts given severity of symptoms. Symptoms seem to be worsened with exertion or even minimal movement. In mid January she experienced symptoms consistent with a URI. She was seen evaluated by ENT specialist and was placed on antibiotics. She had outpatient Covid test which were negative. She was then placed on steroids. She took that medication for couple days with no significant provement. Patient initially thought symptoms were secondary to respiratory tract infection. Given persistence and worsening symptoms she sought evaluation in ED.  When chest pain present is described as a tightening and sharp sensation throughout her chest.  Pain currently rated a 1/10 severity. Pain worsens with movement or exertion. Nursing Notes were reviewedand agreed with or any disagreements were addressed in the HPI. REVIEW OF SYSTEMS    (2-9 systems for level 4, 10 or more for level 5)     Review of Systems   Constitutional: Positive for fatigue. Negative for chills and fever. HENT: Negative. Eyes: Negative. Respiratory: Positive for chest tightness and shortness of breath. Cardiovascular: Positive for chest pain and palpitations. Gastrointestinal: Negative. Genitourinary: Negative. Musculoskeletal: Negative for arthralgias and myalgias. Neurological: Positive for light-headedness. Psychiatric/Behavioral: Negative for behavioral problems. Except as noted above the remainder of the review of systems was reviewed and negative.        PAST MEDICAL HISTORY         Diagnosis Date    Anxiety     Atrial fibrillation (HCC)     Fibromyalgia     Fibromyalgia     Hypertension     Hypotension     Hypothyroidism     Irregular heart rate        SURGICAL HISTORY           Procedure Laterality Date    CARDIAC CATHETERIZATION      COLONOSCOPY N/A 2020    COLONOSCOPY WITH BIOPSY performed by Swati Velazquez MD at [address] [facility]  2020    COLONOSCOPY POLYPECTOMY SNARE/COLD BIOPSY performed by Swati Velazquez MD at [address] [facility]  [id number]    adenoidectomy    TUBAL LIGATION      UPPER GASTROINTESTINAL ENDOSCOPY N/A 2020    EGD BIOPSY performed by Swati Velazquez MD at [address]     [unfilled]    ALLERGIES     Cefdinir, Food, Tylenol with codeine #3 [acetaminophen-codeine], and Codeine    FAMILY HISTORY           Problem Relation Age of Onset    High Blood Pressure Mother     Dementia Mother      Family Status   Relation Name Status    Mother  Alive    Father          SOCIAL HISTORY reports that she has never smoked. She has never used smokeless tobacco. She reports current alcohol use. She reports that she does not use drugs. PHYSICAL EXAM    (up to 7 for level 4, 8 or more for level 5)     ED Triage Vitals   Enc Vitals Group      BP 02/12/21 1232 (!) 169/108      Pulse 02/12/21 1232 135      Resp 02/12/21 1232 14      Temp 02/12/21 1232 98.1 °F (36.7 °C)      Temp Source 02/12/21 1232 Oral      SpO2 02/12/21 1232 100 %      Weight 02/12/21 1646 139 lb 8.8 oz (63.3 kg)      Height 02/12/21 1646 5' 8\" (1.727 m)      Head Circumference --       Peak Flow --       Pain Score --       Pain Loc --       Pain Edu? --       Excl. in [address]? --         Physical Exam  Constitutional:       Appearance: Normal appearance. HENT:      Head: Normocephalic and atraumatic. Neck:      Musculoskeletal: Normal range of motion and neck supple. Cardiovascular:      Rate and Rhythm: Tachycardia present. Rhythm irregular. Pulmonary:      Effort: Pulmonary effort is normal. No respiratory distress. Breath sounds: Normal breath sounds. Musculoskeletal: Normal range of motion. Skin:     General: Skin is warm. Neurological:      General: No focal deficit present. Mental Status: She is alert and oriented to person, place, and time. Psychiatric:         Mood and Affect: Mood normal.         Behavior: Behavior normal.           DIAGNOSTIC RESULTS     EKG: All EKG's are interpreted by the Emergency Department Physician who either signs or Co-signs this chart in the absence of a cardiologist.    RADIOLOGY:   Non-plain film images such as CT, Ultrasound and MRI are read by the radiologist. Plain radiographic images are visualized and preliminarilyinterpreted by the emergency physician with the below findings:    Interpretation per the Radiologist below,if available at the time of this note:    XR CHEST (2 VW)   Final Result   1. No acute abnormality.                LABS:  Labs Reviewed acute abnormality. She has no history of CHF. Suspect secondary to arrhythmia. TSH within normal limits. Remainder of labs as above. Patient be admitted for further inpatient treatment, work-up and evaluation. Patient with cardiac cath and ECHO in 2/2019, reports as below. CATH 2/25/2019  FINDINGS  1. Left dominant coronary arterial circulation with no angiographic  evidence of coronary atherosclerosis. 2.  Left ventricular end-diastolic pressure of approximately 12 mmHg. 3.  Normal left ventricular systolic function. LV ejection fraction of  55%. 4.  No gradient across the aortic valve on pullback to suggest aortic  stenosis. 5.  Significant systemic hypertension.     ECHO 2/25/2019    Conclusions      Summary   Normal left ventricular size, wall thickness and wall motion with an   estimated ejection fraction of 55-60%. Normal left atrial size. The right ventricle is normal in size and function. There is trivial tricuspid regurgitation with the systolic pulmonary artery   pressure (SPAP) estimated at 31 mmHg      Signature    CONSULTS:  IP CONSULT TO PHARMACY    PROCEDURES:  Procedures    FINAL IMPRESSION      1. Atrial flutter, unspecified type ([address]    2. Elevated brain natriuretic peptide (BNP) level          DISPOSITION/PLAN   [unfilled]    PATIENT REFERRED TO:  No follow-up provider specified.     DISCHARGE MEDICATIONS:  Current Discharge Medication List          (Please note that portions of this note were completed with a voice recognition program.  Efforts were made to edit the dictations but occasionally words are mis-transcribed.)    LISA Gandhi PA-C  02/12/21 [address] LISA Mejia  02/12/21 0625

## 2021-02-12 NOTE — ED PROVIDER NOTES
[facility]  eMERGENCY dEPARTMENT eNCOUnter   Physician Attestation    Pt Name: Twila Crandall  MRN: [id number]  [place] 1960  Date of evaluation: 2/12/21        Physician Note:    I havepersonally performed and/or participated in the history, exam and medical decision making and agree with all pertinent clinical information. I have also reviewed and agree with the past medical, family and social historyunless otherwise noted. I have personally performed a face to face diagnostic evaluation onthis patient. I have reviewed the mid-levels findings and agree. History: This is a 41-year-old female with a history of hypertension. Non-smoker. Symptoms really began back in mid January with upper respiratory tract infection symptoms with sinus congestion and sore throat. She eventually saw her ear nose and throat doctor who did write for some steroids but wanted to be tested for Covid. She went to an urgent care they did both a rapid and PCR both of which came back negative. So at that point she was started on the steroids. Also went to the emergency room in [place] last week. She describes a negative cardiac work-up and another rapid Covid test that was negative. Continues to have palpitations and chest pain. Today was much worse so she comes in for evaluation. Pain is a tightness and sharp. Pain scale at the moment is 5 however earlier today it was a 15. It is made worse with exertion and also when she rolled over in bed she noticed the palpitations more. Physical Exam  Constitutional:       Appearance: She is well-developed. She is not diaphoretic. HENT:      Head: Normocephalic and atraumatic. Right Ear: External ear normal.      Left Ear: External ear normal.   Eyes:      General: No scleral icterus. Right eye: No discharge. Left eye: No discharge. Neck:      Musculoskeletal: Normal range of motion. Thyroid: No thyromegaly.       Vascular: No JVD. Trachea: No tracheal deviation. Cardiovascular:      Rate and Rhythm: Tachycardia present. Rhythm irregularly irregular. Heart sounds: Normal heart sounds. No murmur. No friction rub. No gallop. Pulmonary:      Effort: Pulmonary effort is normal. No respiratory distress. Breath sounds: Normal breath sounds. No stridor. No wheezing or rales. Abdominal:      General: There is no distension. Palpations: Abdomen is soft. Tenderness: There is no abdominal tenderness. There is no guarding or rebound. Musculoskeletal:         General: No tenderness. Skin:     General: Skin is warm and dry. Findings: No rash (On exposed body surfaces). Neurological:      Mental Status: She is alert and oriented to person, place, and time. Coordination: Coordination normal.   Psychiatric:         Behavior: Behavior normal.         Thought Content: Thought content normal.     EKG visualized preliminarily interpreted by myself shows atrial flutter with a variable block. The rate on this cardiogram is 119 with an axis of 57. Typical nonspecific ST and T wave findings. On the cardiac monitor though at times her heart rate did go up to 160. CRITICAL CARE: There was a high probability of clinically significant/life threatening deterioration in this patient's condition which required my urgent intervention. Total critical care time was 30 minutes. This excludes any time for separately reportable procedures. Patient was started on intravenous Cardizem. Xr Chest (2 Vw)    Result Date: 2/12/2021  EXAMINATION: TWO XRAY VIEWS OF THE CHEST 2/12/2021 1:10 pm COMPARISON: None available. HISTORY: ORDERING SYSTEM PROVIDED HISTORY: chest pain TECHNOLOGIST PROVIDED HISTORY: Reason for exam:->chest pain Reason for Exam: Chest Pain Acuity: Acute Type of Exam: Initial FINDINGS: The lungs are clear. The cardiac silhouette is within normal limits. There is no pneumothorax or pleural effusion. 1.  No acute abnormality. 1. Atrial flutter, unspecified type ([facility] [address]    2. Elevated brain natriuretic peptide (BNP) level          DISPOSITION/PLAN  PATIENT REFERRED TO:  No follow-up provider specified.   DISCHARGE MEDICATIONS:  New Prescriptions    No medications on file         MD Kelli Thomas MD  02/12/21 1834

## 2021-02-12 NOTE — ED TRIAGE NOTES
Patient to ED via private car c/o chest pain and palpitations since January. Patient states she has been on steroids which make the pain worse.

## 2021-02-12 NOTE — PROGRESS NOTES
Pt states she has an active DNR. Pt stated \"if there is no chance of me surviving let me go and be comfortable\". Paperwork requested from pt. Pt Daughter Zeeshan Morales is POA, Emergency contacts updated with daughters phone number. MD Moran made aware of above to change code status in chart. Awaiting response.        Electronically signed by Ki Warren RN on 2/12/2021 at 6:29 PM

## 2021-02-12 NOTE — PROGRESS NOTES
Pt arrived to floor via stretcher from ED and ambulated to bed. Telemetry activated. Patient oriented to room and use of call light. Call light and personal items within reach. Admission and assessment initiated. POC and education initiated and reviewed with patient. Telemetry-90 . Denied further needs or questions at this time. Will continue to monitor.         Electronically signed by Julio Cesar Byrne RN on 2/12/2021 at 5:29 PM

## 2021-02-13 PROBLEM — M79.7 FIBROMYALGIA: Status: ACTIVE | Noted: 2021-02-13

## 2021-02-13 LAB
ANION GAP SERPL CALCULATED.3IONS-SCNC: 12 MMOL/L (ref 3–16)
BUN BLDV-MCNC: 10 MG/DL (ref 7–20)
CALCIUM SERPL-MCNC: 9.7 MG/DL (ref 8.3–10.6)
CHLORIDE BLD-SCNC: 101 MMOL/L (ref 99–110)
CO2: 27 MMOL/L (ref 21–32)
CREAT SERPL-MCNC: 0.6 MG/DL (ref 0.6–1.2)
GFR AFRICAN AMERICAN: >60
GFR NON-AFRICAN AMERICAN: >60
GLUCOSE BLD-MCNC: 96 MG/DL (ref 70–99)
HCT VFR BLD CALC: 41.3 % (ref 36–48)
HEMOGLOBIN: 13.7 G/DL (ref 12–16)
MCH RBC QN AUTO: 30.6 PG (ref 26–34)
MCHC RBC AUTO-ENTMCNC: 33.1 G/DL (ref 31–36)
MCV RBC AUTO: 92.4 FL (ref 80–100)
PDW BLD-RTO: 14.5 % (ref 12.4–15.4)
PLATELET # BLD: 319 K/UL (ref 135–450)
PMV BLD AUTO: 8 FL (ref 5–10.5)
POTASSIUM REFLEX MAGNESIUM: 4.1 MMOL/L (ref 3.5–5.1)
RBC # BLD: 4.48 M/UL (ref 4–5.2)
SODIUM BLD-SCNC: 140 MMOL/L (ref 136–145)
WBC # BLD: 4.8 K/UL (ref 4–11)

## 2021-02-13 PROCEDURE — 6360000002 HC RX W HCPCS: Performed by: INTERNAL MEDICINE

## 2021-02-13 PROCEDURE — 6370000000 HC RX 637 (ALT 250 FOR IP): Performed by: INTERNAL MEDICINE

## 2021-02-13 PROCEDURE — 94760 N-INVAS EAR/PLS OXIMETRY 1: CPT

## 2021-02-13 PROCEDURE — 2060000000 HC ICU INTERMEDIATE R&B

## 2021-02-13 PROCEDURE — 2580000003 HC RX 258: Performed by: INTERNAL MEDICINE

## 2021-02-13 PROCEDURE — 36415 COLL VENOUS BLD VENIPUNCTURE: CPT

## 2021-02-13 PROCEDURE — 2500000003 HC RX 250 WO HCPCS: Performed by: PHYSICIAN ASSISTANT

## 2021-02-13 PROCEDURE — 80048 BASIC METABOLIC PNL TOTAL CA: CPT

## 2021-02-13 PROCEDURE — 99254 IP/OBS CNSLTJ NEW/EST MOD 60: CPT | Performed by: INTERNAL MEDICINE

## 2021-02-13 PROCEDURE — 85027 COMPLETE CBC AUTOMATED: CPT

## 2021-02-13 PROCEDURE — 2580000003 HC RX 258: Performed by: PHYSICIAN ASSISTANT

## 2021-02-13 RX ORDER — LORAZEPAM 1 MG/1
3 TABLET ORAL
Status: DISCONTINUED | OUTPATIENT
Start: 2021-02-13 | End: 2021-02-14 | Stop reason: HOSPADM

## 2021-02-13 RX ORDER — LORAZEPAM 2 MG/ML
4 INJECTION INTRAMUSCULAR
Status: DISCONTINUED | OUTPATIENT
Start: 2021-02-13 | End: 2021-02-14 | Stop reason: HOSPADM

## 2021-02-13 RX ORDER — SODIUM CHLORIDE 0.9 % (FLUSH) 0.9 %
10 SYRINGE (ML) INJECTION EVERY 12 HOURS SCHEDULED
Status: DISCONTINUED | OUTPATIENT
Start: 2021-02-13 | End: 2021-02-14 | Stop reason: HOSPADM

## 2021-02-13 RX ORDER — LORAZEPAM 2 MG/ML
1 INJECTION INTRAMUSCULAR
Status: DISCONTINUED | OUTPATIENT
Start: 2021-02-13 | End: 2021-02-14 | Stop reason: HOSPADM

## 2021-02-13 RX ORDER — LORAZEPAM 1 MG/1
1 TABLET ORAL
Status: DISCONTINUED | OUTPATIENT
Start: 2021-02-13 | End: 2021-02-14 | Stop reason: HOSPADM

## 2021-02-13 RX ORDER — LORAZEPAM 2 MG/ML
3 INJECTION INTRAMUSCULAR
Status: DISCONTINUED | OUTPATIENT
Start: 2021-02-13 | End: 2021-02-14 | Stop reason: HOSPADM

## 2021-02-13 RX ORDER — SODIUM CHLORIDE 0.9 % (FLUSH) 0.9 %
10 SYRINGE (ML) INJECTION PRN
Status: DISCONTINUED | OUTPATIENT
Start: 2021-02-13 | End: 2021-02-14 | Stop reason: HOSPADM

## 2021-02-13 RX ORDER — LORAZEPAM 1 MG/1
4 TABLET ORAL
Status: DISCONTINUED | OUTPATIENT
Start: 2021-02-13 | End: 2021-02-14 | Stop reason: HOSPADM

## 2021-02-13 RX ORDER — DILTIAZEM HYDROCHLORIDE 120 MG/1
120 CAPSULE, COATED, EXTENDED RELEASE ORAL DAILY
Status: DISCONTINUED | OUTPATIENT
Start: 2021-02-13 | End: 2021-02-14 | Stop reason: HOSPADM

## 2021-02-13 RX ORDER — LORAZEPAM 1 MG/1
2 TABLET ORAL
Status: DISCONTINUED | OUTPATIENT
Start: 2021-02-13 | End: 2021-02-14 | Stop reason: HOSPADM

## 2021-02-13 RX ORDER — LORAZEPAM 2 MG/ML
2 INJECTION INTRAMUSCULAR
Status: DISCONTINUED | OUTPATIENT
Start: 2021-02-13 | End: 2021-02-14 | Stop reason: HOSPADM

## 2021-02-13 RX ADMIN — CETIRIZINE HYDROCHLORIDE 10 MG: 10 TABLET, FILM COATED ORAL at 08:43

## 2021-02-13 RX ADMIN — OXYCODONE HYDROCHLORIDE AND ACETAMINOPHEN 500 MG: 500 TABLET ORAL at 08:43

## 2021-02-13 RX ADMIN — Medication 2000 UNITS: at 08:42

## 2021-02-13 RX ADMIN — LEVOTHYROXINE SODIUM 50 MCG: 0.05 TABLET ORAL at 05:10

## 2021-02-13 RX ADMIN — MULTIPLE VITAMINS W/ MINERALS TAB 1 TABLET: TAB at 08:42

## 2021-02-13 RX ADMIN — DILTIAZEM HYDROCHLORIDE 2.5 MG/HR: 5 INJECTION INTRAVENOUS at 05:09

## 2021-02-13 RX ADMIN — DILTIAZEM HYDROCHLORIDE 120 MG: 120 CAPSULE, COATED, EXTENDED RELEASE ORAL at 13:48

## 2021-02-13 RX ADMIN — CARVEDILOL 12.5 MG: 12.5 TABLET, FILM COATED ORAL at 18:06

## 2021-02-13 RX ADMIN — POLYETHYLENE GLYCOL 3350 17 G: 17 POWDER, FOR SOLUTION ORAL at 20:14

## 2021-02-13 RX ADMIN — SODIUM CHLORIDE, PRESERVATIVE FREE 10 ML: 5 INJECTION INTRAVENOUS at 20:14

## 2021-02-13 RX ADMIN — ENOXAPARIN SODIUM 70 MG: 80 INJECTION SUBCUTANEOUS at 08:42

## 2021-02-13 RX ADMIN — DILTIAZEM HYDROCHLORIDE 5 MG/HR: 5 INJECTION INTRAVENOUS at 00:20

## 2021-02-13 RX ADMIN — CARVEDILOL 12.5 MG: 12.5 TABLET, FILM COATED ORAL at 08:43

## 2021-02-13 RX ADMIN — ENOXAPARIN SODIUM 70 MG: 80 INJECTION SUBCUTANEOUS at 20:14

## 2021-02-13 ASSESSMENT — PAIN SCALES - GENERAL: PAINLEVEL_OUTOF10: 0

## 2021-02-13 NOTE — PROGRESS NOTES
118/76   Pulse 108   Temp 97.4 °F (36.3 °C) (Oral)   Resp 17   Ht 5' 8\" (1.727 m)   Wt 162 lb 7.7 oz (73.7 kg)   SpO2 99%   BMI 24.70 kg/m²     General appearance: No apparent distress, appears stated age and cooperative. HEENT: Pupils equal, round, and reactive to light. Conjunctivae/corneas clear. Neck: Supple, with full range of motion. No jugular venous distention. Trachea midline. Respiratory:  Normal respiratory effort. Clear to auscultation, bilaterally without Rales/Wheezes/Rhonchi. Cardiovascular: irregular rate and rhythm with normal S1/S2   Abdomen: Soft, non-tender, non-distended with normal bowel sounds. Musculoskeletal: No clubbing, cyanosis or edema bilaterally. Skin: Skin color, texture, turgor normal.  No rashes or lesions. Neurologic:  Neurovascularly intact without any focal sensory/motor deficits. Cranial nerves: II-XII intact, grossly non-focal.  Psychiatric: Alert and oriented, thought content appropriate, normal insight  Capillary Refill: Brisk,< 3 seconds   Peripheral Pulses: +2 palpable, equal bilaterally       Labs:   Recent Labs     02/12/21  1257 02/13/21  0523   WBC 6.3 4.8   HGB 13.6 13.7   HCT 41.4 41.3    319     Recent Labs     02/12/21  1257 02/13/21  0523   * 140   K 4.4 4.1   CL 97* 101   CO2 24 27   BUN 16 10   CREATININE 0.6 0.6   CALCIUM 9.8 9.7     Recent Labs     02/12/21  1257   AST 25   ALT 16   BILITOT <0.2   ALKPHOS 45     No results for input(s): INR in the last 72 hours. Recent Labs     02/12/21  1257   TROPONINI <0.01       Urinalysis:    No results found for: Vince Agent, BACTERIA, RBCUA, BLOODU, SPECGRAV, GLUCOSEU    Radiology:  XR CHEST (2 VW)   Final Result   1. No acute abnormality.                  Assessment/Plan:    Atrial fibrillation with RVR  Transition diltiazem IV to oral  Transition Lovenox to oral anticoagulant  Cardiology following  New onset    Hypothyroidism  Continue home Synthroid    Fibromyalgia  Continue home medication    Anxiety   continue home medication    Hypertension  Continue home medication        DVT Prophylaxis: Lovenox  Diet: DIET GENERAL;  Code Status: Full Code    PT/OT Eval Status: Not applicable    Dispo -inpatient, likely discharge tomorrow    Scarlett Mendoza MD

## 2021-02-13 NOTE — CONSULTS
History and Physical  [place] [address]   Cardiology    Chief Complaint: afib    HPI:     Patient is a 63 y.o. female presented with rapid tachypalpitations which she describes on and off for awhile. She was found in afib. She has had chest pain, back pain and sometimes side pain for past two months all the time which actually improves on the elliptical exercising. It started with a viral pulmonary type syndrome. Since the palps were more prolonged she came and afib discovered. In 2/2019 she experienced similar palps and had a negative cath and echo then. Her [id number] score is 2. Cards consult.       Past Medical History:   Diagnosis Date    Anxiety     Atrial fibrillation (HCC)     Fibromyalgia     Fibromyalgia     Hypertension     Hypotension     Hypothyroidism     Irregular heart rate       Past Surgical History:   Procedure Laterality Date    CARDIAC CATHETERIZATION  2019    COLONOSCOPY N/A 2/7/2020    COLONOSCOPY WITH BIOPSY performed by Zoila Cormier MD at [address]  2/25/2020    COLONOSCOPY POLYPECTOMY SNARE/COLD BIOPSY performed by Zoila Cormier MD at [address] [facility]  [id number]    adenoidectomy    TUBAL LIGATION      UPPER GASTROINTESTINAL ENDOSCOPY N/A 2/7/2020    [facility]        Medications Prior to Admission: Cholecalciferol (VITAMIN D) 50 MCG (2000 UT) CAPS capsule, Take 2,000 Units by mouth daily  Turmeric 1053 MG TABS, Take 1 tablet by mouth daily  polyethylene glycol (GLYCOLAX) 17 g packet, Take 17 g by mouth nightly  carvedilol (COREG) 12.5 MG tablet, TAKE 1 TABLET TWICE A DAY WITH MEALS  Omega-3 Fatty Acids (FISH OIL CONCENTRATE PO), Take 1 capsule by mouth daily   Multiple Vitamins-Minerals (THERAPEUTIC MULTIVITAMIN-MINERALS) tablet, Take 1 tablet by mouth daily  Ascorbic Acid (VITAMIN C IMMUNE HEALTH PO), Take 500 mg by mouth daily   Coenzyme Q10 (COQ10 PO), Take 1 tablet by mouth daily   levothyroxine (SYNTHROID) 50 MCG tablet, Take 50 mcg by mouth Daily  cetirizine (ZYRTEC) 10 MG tablet, Take 10 mg by mouth daily  cyclobenzaprine (FLEXERIL) 10 MG tablet, Take 10 mg by mouth nightly as needed for Muscle spasms  diazepam (VALIUM) 5 MG tablet, Take 5 mg by mouth as needed for Anxiety  fluticasone (FLONASE) 50 MCG/ACT nasal spray, 1 spray by Nasal route daily as needed for Allergies     Allergies   Allergen Reactions    Cefdinir Other (See Comments)    Food      avacodos    Tylenol With Codeine #3 [Acetaminophen-Codeine] Other (See Comments)     hallicunations    Codeine Other (See Comments)     HALLUCINATIONS  HALLUCINATIONS        Social History     Tobacco Use    Smoking status: Never Smoker    Smokeless tobacco: Never Used   Substance Use Topics    Alcohol use: Yes     Comment: occasional      Family History   Problem Relation Age of Onset    High Blood Pressure Mother     Dementia Mother         Review of Systems:  · Constitutional: No Fever or Weight Loss  · Eyes: No decreased vision  · ENT: No Headaches, Hearing Loss or Vertigo  · Cardiovascular: atypical chest pain, palpitations  · Respiratory: No cough or wheezing    · Gastrointestinal: No abdominal pain, appetite loss, blood in stools, constipation, diarrhea or heartburn  · Genitourinary: No dysuria, trouble voiding, or hematuria  · Musculoskeletal:  fibromyalgia  · Integumentary: No rash or pruritis  · Neurological: No TIA or stroke symptoms  · Psychiatric: No anxiety or depression  · Endocrine: No malaise, fatigue or temperature intolerance  · Hematologic/Lymphatic: No bleeding problems, blood clots or swollen lymph nodes  · Allergic/Immunologic: No nasal congestion or hives      Objective Data:     /76   Pulse 128   Temp 97.4 °F (36.3 °C) (Oral)   Resp 17   Ht 5' 8\" (1.727 m)   Wt 162 lb 7.7 oz (73.7 kg)   SpO2 99%   BMI 24.70 kg/m²     General appearance: alert, appears stated age and cooperative  Alert, awake, oriented x 3  Eyes:  No erythema  Head: atraumatic  Neck:  no JVD  Lungs: clear to auscultation bilaterally  Heart: regular rate and rhythm, S1, S2 normal, no murmur, click, rub or gallop  Abdomen: soft, non-tender; bowel sounds normal; no masses,  no organomegaly  Extremities: extremities normal, atraumatic, no cyanosis or edema  Skin: Skin color, texture, turgor normal. No rashes or lesions  Hematologic: no remarkable bruising       ECG: atrial fibrillation     Data Review    Echo:      Conclusions      Summary   Normal left ventricular size, wall thickness and wall motion with an   estimated ejection fraction of 55-60%. Normal left atrial size. The right ventricle is normal in size and function. There is trivial tricuspid regurgitation with the systolic pulmonary artery   pressure (SPAP) estimated at 31 mmHg      Signature      ------------------------------------------------------------------   Electronically signed by Alan Nieto MD (Interpreting   physician) on 02/26/2019 at 04:36 PM   ------------------------------------------------------------------    Cath 2/27/2019:  FINDINGS  1. Left dominant coronary arterial circulation with no angiographic  evidence of coronary atherosclerosis. 2.  Left ventricular end-diastolic pressure of approximately 12 mmHg. 3.  Normal left ventricular systolic function. LV ejection fraction of  55%. 4.  No gradient across the aortic valve on pullback to suggest aortic  stenosis.   5.  Significant systemic hypertension.  Joey Li MD    Recent Labs     02/12/21  1257 02/13/21  0523   * 140   K 4.4 4.1   CL 97* 101   CO2 24 27   BUN 16 10   CREATININE 0.6 0.6     Recent Labs     02/12/21  1257 02/13/21  0523   WBC 6.3 4.8   HGB 13.6 13.7   HCT 41.4 41.3   MCV 91.1 92.4    319     Lab Results   Component Value Date    TROPONINI <0.01 02/12/2021         Assessment:     Active Problems:    Other chest pain Flutter-fibrillation ([facility] [address]    Fibromyalgia  Resolved Problems:    * No resolved hospital problems. *      Plan:     1. She clearly has afib which has converted. Will add cardizem po and consider po anticoagulation. 2. Despite normal rhythm she still feels palps and is unsettled about constant chest pain still. While the chest pain is not likely cardiac, will observe another day or so. 3. Colleagues to follow.   4. Will  obtain overnight pulse ox for poor sleep, add po cardizem, ambulate

## 2021-02-13 NOTE — PROGRESS NOTES
Occupational Therapy  No Eval/Discharge    Aleksey Ballard  2/13/2021    OT orders received. OT to pt's room for OT eval/tx. Pt reports UAL in room managing ADLs independently without difficulty, and denies need for OT services. Board in room confirms pt UAL. No acute OT services indicated, will discharge.     Kimmie Vu, OTR/L [id number]

## 2021-02-13 NOTE — PROGRESS NOTES
Call from [facility] - pt converted to NSR around 1245. Dr. John Rich on the floor - notified him of the change.

## 2021-02-14 VITALS
RESPIRATION RATE: 18 BRPM | HEART RATE: 61 BPM | WEIGHT: 166.67 LBS | DIASTOLIC BLOOD PRESSURE: 73 MMHG | OXYGEN SATURATION: 98 % | BODY MASS INDEX: 25.26 KG/M2 | SYSTOLIC BLOOD PRESSURE: 109 MMHG | TEMPERATURE: 97.4 F | HEIGHT: 68 IN

## 2021-02-14 LAB
ANION GAP SERPL CALCULATED.3IONS-SCNC: 12 MMOL/L (ref 3–16)
BUN BLDV-MCNC: 11 MG/DL (ref 7–20)
CALCIUM SERPL-MCNC: 9.6 MG/DL (ref 8.3–10.6)
CHLORIDE BLD-SCNC: 97 MMOL/L (ref 99–110)
CO2: 28 MMOL/L (ref 21–32)
CREAT SERPL-MCNC: 0.6 MG/DL (ref 0.6–1.2)
GFR AFRICAN AMERICAN: >60
GFR NON-AFRICAN AMERICAN: >60
GLUCOSE BLD-MCNC: 95 MG/DL (ref 70–99)
HCT VFR BLD CALC: 39.9 % (ref 36–48)
HEMOGLOBIN: 13.1 G/DL (ref 12–16)
MCH RBC QN AUTO: 30.2 PG (ref 26–34)
MCHC RBC AUTO-ENTMCNC: 32.9 G/DL (ref 31–36)
MCV RBC AUTO: 91.6 FL (ref 80–100)
PDW BLD-RTO: 14.6 % (ref 12.4–15.4)
PLATELET # BLD: 269 K/UL (ref 135–450)
PMV BLD AUTO: 7.7 FL (ref 5–10.5)
POTASSIUM REFLEX MAGNESIUM: 4.6 MMOL/L (ref 3.5–5.1)
RBC # BLD: 4.36 M/UL (ref 4–5.2)
SODIUM BLD-SCNC: 137 MMOL/L (ref 136–145)
WBC # BLD: 4.9 K/UL (ref 4–11)

## 2021-02-14 PROCEDURE — 36415 COLL VENOUS BLD VENIPUNCTURE: CPT

## 2021-02-14 PROCEDURE — 85027 COMPLETE CBC AUTOMATED: CPT

## 2021-02-14 PROCEDURE — 2580000003 HC RX 258: Performed by: INTERNAL MEDICINE

## 2021-02-14 PROCEDURE — 80048 BASIC METABOLIC PNL TOTAL CA: CPT

## 2021-02-14 PROCEDURE — 6370000000 HC RX 637 (ALT 250 FOR IP): Performed by: INTERNAL MEDICINE

## 2021-02-14 PROCEDURE — 99232 SBSQ HOSP IP/OBS MODERATE 35: CPT | Performed by: NURSE PRACTITIONER

## 2021-02-14 PROCEDURE — 6360000002 HC RX W HCPCS: Performed by: INTERNAL MEDICINE

## 2021-02-14 RX ORDER — DIAZEPAM 5 MG/1
5 TABLET ORAL PRN
Qty: 3 TABLET | Refills: 0 | Status: SHIPPED | OUTPATIENT
Start: 2021-02-14 | End: 2021-02-17

## 2021-02-14 RX ORDER — CYCLOBENZAPRINE HCL 10 MG
10 TABLET ORAL NIGHTLY PRN
Qty: 3 TABLET | Refills: 0 | Status: SHIPPED | OUTPATIENT
Start: 2021-02-14 | End: 2021-02-17

## 2021-02-14 RX ORDER — DILTIAZEM HYDROCHLORIDE 120 MG/1
120 CAPSULE, COATED, EXTENDED RELEASE ORAL DAILY
Qty: 30 CAPSULE | Refills: 3 | Status: SHIPPED | OUTPATIENT
Start: 2021-02-15 | End: 2021-02-22

## 2021-02-14 RX ADMIN — DILTIAZEM HYDROCHLORIDE 120 MG: 120 CAPSULE, COATED, EXTENDED RELEASE ORAL at 09:39

## 2021-02-14 RX ADMIN — OXYCODONE HYDROCHLORIDE AND ACETAMINOPHEN 500 MG: 500 TABLET ORAL at 09:42

## 2021-02-14 RX ADMIN — MULTIPLE VITAMINS W/ MINERALS TAB 1 TABLET: TAB at 09:42

## 2021-02-14 RX ADMIN — DIAZEPAM 5 MG: 5 TABLET ORAL at 01:11

## 2021-02-14 RX ADMIN — CYCLOBENZAPRINE 10 MG: 10 TABLET, FILM COATED ORAL at 01:11

## 2021-02-14 RX ADMIN — Medication 2000 UNITS: at 09:42

## 2021-02-14 RX ADMIN — ENOXAPARIN SODIUM 70 MG: 80 INJECTION SUBCUTANEOUS at 09:39

## 2021-02-14 RX ADMIN — SODIUM CHLORIDE, PRESERVATIVE FREE 10 ML: 5 INJECTION INTRAVENOUS at 09:49

## 2021-02-14 RX ADMIN — CETIRIZINE HYDROCHLORIDE 10 MG: 10 TABLET, FILM COATED ORAL at 09:40

## 2021-02-14 RX ADMIN — CARVEDILOL 12.5 MG: 12.5 TABLET, FILM COATED ORAL at 09:40

## 2021-02-14 RX ADMIN — LEVOTHYROXINE SODIUM 50 MCG: 0.05 TABLET ORAL at 09:39

## 2021-02-14 NOTE — PROGRESS NOTES
Cardiac Electrophysiology Progress Note     Admit Date: 2021     Reason for follow up: A fib with RVR    HPI and Interval History:   The patient is a 63year old female with past medical history significant for paroxysmal atrial fibrillation, HTN, hypothyroid, anxiety and fibromyalgia who presented to the ED with palpitations and chest pain. She was noted to be in A fib with RVR and did convert to SR. She has had about 4 episodes of A fib over the last 3 years. Typically has palpitations, this time had CP. Was recently treated for a URI. She is feeling much better today. She did convert spontaneously to SR around 1300 yesterday. Denies any further CP, palpitations, no SOB. Typically very active at home and exercises regularly without issues. Physical Examination:  Vitals:    21 0356   BP: 129/75   Pulse: 56   Resp: 18   Temp: 97.5 °F (36.4 °C)   SpO2: 99%        Intake/Output Summary (Last 24 hours) at 2021 0820  Last data filed at 2021 0731  Gross per 24 hour   Intake 1080 ml   Output 2125 ml   Net -1045 ml     In: -   Out: 1200    Wt Readings from Last 3 Encounters:   21 166 lb 10.7 oz (75.6 kg)   20 140 lb (63.5 kg)   20 140 lb (63.5 kg)     Temp  Av.7 °F (36.5 °C)  Min: 97.4 °F (36.3 °C)  Max: 98.3 °F (36.8 °C)  Pulse  Av.9  Min: 56  Max: 128  BP  Min: 117/73  Max: 145/94  SpO2  Av %  Min: 98 %  Max: 100 %    · Telemetry: Sinus rhythm in the 70s. · Constitutional: Alert, in no acute distress. Appears stated age. · Head: Normocephalic and atraumatic. · Eyes: Conjunctivae normal. EOM are normal.   · Neck: Neck supple. No lymphadenopathy. No rigidity. No JVD present. · Cardiovascular: Normal rate, regular rhythm. No murmurs, rubs or gallops. No S3 or S4.  · Pulmonary/Chest: Clear breath sounds bilaterally. No crackles, wheezes or rhonchi. No respiratory accessory muscle use. · Abdominal: Soft. Normal bowel sounds present.  No distension, No and wall motion with an   estimated ejection fraction of 55-60%. Normal left atrial size. The right ventricle is normal in size and function. There is trivial tricuspid regurgitation with the systolic pulmonary artery   pressure (SPAP) estimated at 31 mmHg    Stress: 9/26/17 [facility]  No perfusion defect. EF 75%. [facility]: 2/19 [facility]  Per Dr. Audrey Gray note  normal coronaries, normal LVEF    Assessment and Plan:     Paroxysmal atrial fibrillation   - initially noted in 2018 per pt, was seen at [facility] at least once for this   - on Coreg and diltiazem (just added) for rate control   - CHADS2-VASc 2 (gender, HTN) - on therapeutic Lovenox - will change to Eliquis 5mg BID   - discussed long term options including antiarrhythmics or possible ablation, pt would like to avoid medications as much as possible and is interested in ablation, will set up outpatient follow up with Dr. Win Alvarez to discuss    Chest pain   - likely secondary to above    - troponin negative and pt with high exercise tolerance without issues typically   - also had normal coronaries on [facility] in 2019 making ACS less likely    Cardiac status is stable, will sign off and arrange follow up with Dr. Win Alvarez. Please call with concerns.     ORTEGA Tavarez  [facility], [address] [place] [address], [address] [place] [address]  Phone: [phone]  Fax: [phone]    Electronically signed by ORTEGA Ritchie - CNP on 2/14/2021 at 8:20 AM

## 2021-02-14 NOTE — DISCHARGE SUMMARY
Hospital Medicine Discharge Summary    Patient ID: Milan Palencia      Patient's PCP: Jennifer Mccarthy MD    Admit Date: 2/12/2021     Discharge Date:   2/14/21    Admitting Physician: Brittani Underwood MD     Discharge Physician: Sally Kowalski MD     Discharge Diagnoses: Active Hospital Problems    Diagnosis Date Noted    Fibromyalgia [M79.7] 02/13/2021    Flutter-fibrillation ([address] [I48.91, I48.92] 02/12/2021    Other chest pain [R07.89] 02/25/2019       The patient was seen and examined on day of discharge and this discharge summary is in conjunction with any daily progress note from day of discharge. Hospital Course:    Patient is a 69-year-old female with past medical history of hypertension, anxiety, fibromyalgia, hypothyroidism who presents to the hospital for palpitations, chest pain chest tightness and exertional shortness of breath.  According to the patient she recently had upper respiratory tract infection which was treated however she was tested for COVID-19 3 times in the past and has been negative including recently. Poppy Tracey mentions she has feeling of chest tightness and palpitations she feels like funny sensation in her chest.  Patient also feels short of breath denies orthopnea, PND. Patient found to be in atrial fibrillation/flutter. Started on diltiazem drip along with anticoagulation. Patient transition to oral anticoagulation along with oral diltiazem. Patient converted back to normal sinus rhythm as well on February 13. Patient to follow-up with cardiology in the outpatient in 2 weeks.     Atrial fibrillation with RVR  Transition diltiazem IV to oral  Transition Lovenox to oral anticoagulant, Eliquis at discharge  Cardiology following  New onset, spontaneously converted     Hypothyroidism  Continue home Synthroid     Fibromyalgia  Continue home medication     Anxiety   continue home medication     Hypertension  Continue home medication      Exam:     /73 Pulse 61   Temp 97.4 °F (36.3 °C) (Oral)   Resp 18   Ht 5' 8\" (1.727 m)   Wt 166 lb 10.7 oz (75.6 kg)   SpO2 98%   BMI 25.34 kg/m²     General appearance: No apparent distress, appears stated age and cooperative. HEENT: Pupils equal, round, and reactive to light. Conjunctivae/corneas clear. Neck: Supple, with full range of motion. No jugular venous distention. Trachea midline. Respiratory:  Normal respiratory effort. Clear to auscultation, bilaterally without Rales/Wheezes/Rhonchi. Cardiovascular: Regular rate and rhythm with normal S1/S2 without murmurs, rubs or gallops. Abdomen: Soft, non-tender, non-distended with normal bowel sounds. Musculoskeletal: No clubbing, cyanosis or edema bilaterally. Full range of motion without deformity. Skin: Skin color, texture, turgor normal.  No rashes or lesions. Neurologic:  Neurovascularly intact without any focal sensory/motor deficits. Cranial nerves: II-XII intact, grossly non-focal.  Psychiatric: Alert and oriented, thought content appropriate, normal insight      Consults:     IP CONSULT TO PHARMACY  IP CONSULT TO CARDIOLOGY  IP CONSULT TO SOCIAL WORK    Significant Diagnostic Studies:     XR CHEST (2 VW)   Final Result   1. No acute abnormality. Disposition: Home    Condition at Discharge: Stable    Discharge Instructions/Follow-up: PCP, cardiology in 2 weeks    Code Status:  Full Code     Activity: activity as tolerated    Diet: regular diet      Labs:  For convenience and continuity at follow-up the following most recent labs are provided:      CBC:    Lab Results   Component Value Date    WBC 4.9 02/14/2021    HGB 13.1 02/14/2021    HCT 39.9 02/14/2021     02/14/2021       Renal:    Lab Results   Component Value Date     02/14/2021    K 4.6 02/14/2021    CL 97 02/14/2021    CO2 28 02/14/2021    BUN 11 02/14/2021    CREATININE 0.6 02/14/2021    CALCIUM 9.6 02/14/2021       Discharge Medications:     Current Discharge Medication List           Details   apixaban (ELIQUIS) 5 MG TABS tablet Take 1 tablet by mouth 2 times daily  Qty: 60 tablet, Refills: 0      dilTIAZem (CARDIZEM CD) 120 MG extended release capsule Take 1 capsule by mouth daily  Qty: 30 capsule, Refills: 3              Details   cyclobenzaprine (FLEXERIL) 10 MG tablet Take 1 tablet by mouth nightly as needed for Muscle spasms  Qty: 3 tablet, Refills: 0      diazePAM (VALIUM) 5 MG tablet Take 1 tablet by mouth as needed for Anxiety for up to 3 days. Qty: 3 tablet, Refills: 0    Associated Diagnoses: Fibromyalgia              Details   Cholecalciferol (VITAMIN D) 50 MCG (2000 UT) CAPS capsule Take 2,000 Units by mouth daily      Turmeric 1053 MG TABS Take 1 tablet by mouth daily      polyethylene glycol (GLYCOLAX) 17 g packet Take 17 g by mouth nightly      carvedilol (COREG) 12.5 MG tablet TAKE 1 TABLET TWICE A DAY WITH MEALS  Qty: 180 tablet, Refills: 4      Omega-3 Fatty Acids (FISH OIL CONCENTRATE PO) Take 1 capsule by mouth daily       Multiple Vitamins-Minerals (THERAPEUTIC MULTIVITAMIN-MINERALS) tablet Take 1 tablet by mouth daily      Ascorbic Acid (VITAMIN C IMMUNE HEALTH PO) Take 500 mg by mouth daily       Coenzyme Q10 (COQ10 PO) Take 1 tablet by mouth daily       levothyroxine (SYNTHROID) 50 MCG tablet Take 50 mcg by mouth Daily      cetirizine (ZYRTEC) 10 MG tablet Take 10 mg by mouth daily      fluticasone (FLONASE) 50 MCG/ACT nasal spray 1 spray by Nasal route daily as needed for Allergies              No future appointments. Time Spent on discharge is more than 30 minutes in the examination, evaluation, counseling and review of medications and discharge plan. Signed:    Jamari Andrew MD   2/14/2021      Thank you Jacque Courtney MD for the opportunity to be involved in this patient's care. If you have any questions or concerns please feel free to contact me at [phone].

## 2021-02-14 NOTE — DISCHARGE INSTR - COC
Continuity of Care Form    Patient Name: Manav Reyez   :  1960  MRN:  [id number]    Admit date:  2021  Discharge date:  ***    Code Status Order: Full Code   Advance Directives:   Advance Care Flowsheet Documentation     Date/Time Healthcare Directive Type of Healthcare Directive Copy in [address] Agent's Name Healthcare Agent's Phone Number    21 1720  Yes, patient has an advance directive for healthcare treatment  --  No, copy requested from family  Healthcare power of   [facility]  [phone]    21 1718  Yes, patient has an advance directive for healthcare treatment  Durable power of  for health care  No, copy requested from family  [facility] power of   [facility]  [phone]          Admitting Physician:  Hever Guzman MD  PCP: Carlos Norton MD    Discharging Nurse: [facility] Unit/Room#: Q0U-0120/7776-68  Discharging Unit Phone Number: ***    Emergency Contact:   Extended Emergency Contact Information  Primary Emergency Contact: theresajonathan  Home Phone: [phone]  Relation: Brother/Sister  Secondary Emergency Contact: Kalin Mortensen  stiQRd Phone: [phone]  Relation: Child  Preferred language: English   needed? No    Past Surgical History:  Past Surgical History:   Procedure Laterality Date    CARDIAC CATHETERIZATION      COLONOSCOPY N/A 2020    COLONOSCOPY WITH BIOPSY performed by Ella Parsons MD at [place] [address]  2020    COLONOSCOPY POLYPECTOMY SNARE/COLD BIOPSY performed by Ella Parsons MD at [address] [place] [address]  [id number]    adenoidectomy    TUBAL LIGATION      UPPER GASTROINTESTINAL ENDOSCOPY N/A 2020    EGD BIOPSY performed by Ella Parsons MD at [facility] ENDOSCOPY       Immunization History: There is no immunization history on file for this patient.     Active Problems:  Patient Active Problem List   Diagnosis Code    Chest pain due to myocardial ischemia I25.9    Essential hypertension I10    Abnormal EKG R94.31    Other chest pain R07.89    Flutter-fibrillation (HCC) I48.91, I48.92    A-fib (HCC) I48.91    Fibromyalgia M79.7       Isolation/Infection:   Isolation          No Isolation        Patient Infection Status     None to display          Nurse Assessment:  Last Vital Signs: /73   Pulse 61   Temp 97.4 °F (36.3 °C) (Oral)   Resp 18   Ht 5' 8\" (1.727 m)   Wt 166 lb 10.7 oz (75.6 kg)   SpO2 98%   BMI 25.34 kg/m²     Last documented pain score (0-10 scale): Pain Level: 0  Last Weight:   Wt Readings from Last 1 Encounters:   02/14/21 166 lb 10.7 oz (75.6 kg)     Mental Status:  {IP PT MENTAL STATUS:20030}    IV Access:  { HARSHIL IV ACCESS:092462221}    Nursing Mobility/ADLs:  Walking   {[facility] ZAVK:725076857}  Transfer  {[facility] WXHU:864472528}  Bathing  {[facility] D[facility] KYIF:530150829}  Dressing  {[facility] D[facility] ANGJ:563275171}  Toileting  {[facility] D[facility] LLFR:564014705}  Feeding  {[facility] D[facility] DJRP:025092605}  Med Admin  {[facility] D[facility] QRJE:725025855}  Med Delivery   { HARSHIL MED Delivery:615025141}    Wound Care Documentation and Therapy:        Elimination:  Continence:   · Bowel: {YES / IA:37059}  · Bladder: {YES / NC:65826}  Urinary Catheter: {Urinary Catheter:575404743}   Colostomy/Ileostomy/Ileal Conduit: {YES / EZ:57193}       Date of Last BM: ***    Intake/Output Summary (Last 24 hours) at 2/14/2021 1215  Last data filed at 2/14/2021 0911  Gross per 24 hour   Intake 900 ml   Output 2525 ml   Net -1625 ml     I/O last 3 completed shifts:   In: 1080 [P.O.:1080]  Out: 925 [Urine:925]    Safety Concerns:     [employer] Safety Concerns:714960622}    Impairments/Disabilities:      [address] HARSHIL Impairments/Disabilities:075879397}    Nutrition Therapy:  Current Nutrition Therapy:   [address] HARSHIL Diet List:383065453}    Routes of Feeding: {CHP DME Other Feedings:066128784}  Liquids: {Slp liquid thickness:93600}  Daily Fluid Restriction: {CHP DME Yes amt example:743213460}  Last Modified Barium Swallow with Video (Video Swallowing Test): {Done Not Done HCTT:425122466}    Treatments at the Time of Hospital Discharge:   Respiratory Treatments: ***  Oxygen Therapy:  {Therapy; copd oxygen:11127}  Ventilator:    {MH CC Vent UZHC:915068411}    Rehab Therapies: {THERAPEUTIC INTERVENTION:8365500000}  Weight Bearing Status/Restrictions: { CC Weight Bearin}  Other Medical Equipment (for information only, NOT a DME order):  {EQUIPMENT:473641078}  Other Treatments: ***    Patient's personal belongings (please select all that are sent with patient):  {CHP DME Belongings:729308708}    RN SIGNATURE:  {Esignature:981808528}    CASE MANAGEMENT/SOCIAL WORK SECTION    Inpatient Status Date: ***    Readmission Risk Assessment Score:  Readmission Risk              Risk of Unplanned Readmission:        11           Discharging to Facility/ Agency   · Name:   · Address:  · Phone:  · Fax:    Dialysis Facility (if applicable)   · Name:  · Address:  · Dialysis Schedule:  · Phone:  · Fax:    / signature: {Esignature:864966042}    PHYSICIAN SECTION    Prognosis: {Prognosis:0615287192}    Condition at Discharge: [facility] Patient Condition:883555565}    Rehab Potential (if transferring to Rehab): {Prognosis:7840625075}    Recommended Labs or Other Treatments After Discharge: ***    Physician Certification: I certify the above information and transfer of Seferino Candelario  is necessary for the continuing treatment of the diagnosis listed and that she requires {Admit to Appropriate Level of Care:65913} for {GREATER/LESS:720996883} 30 days.      Update Admission H&P: {CHP DME Changes in ZHBET:109255698}    PHYSICIAN SIGNATURE:  {Esignature:543172573}

## 2021-02-14 NOTE — PROGRESS NOTES
PT placed on overnight oximetry study on r/a. Reminded that it cannot get disconnected or will have to repeat study. RN made aware. Sign placed on door.

## 2021-02-14 NOTE — CARE COORDINATION
INITIAL CASE MANAGEMENT ASSESSMENT    Reviewed chart, met with patient to assess possible discharge needs. Explained Case Management role/services. SW interviewed patient via telephone today. Living Situation: Patient reports that she resides in a single family home with her brother Nelia Benson and one dog. There is one stair leading up to the front door and prior to medical admission she had no trouble getting in or out of the property. ADLs: Prior to medical admission patient reports that she was independent. She stated that she doesn't anticipate any needs at discharge. DME: Prior to medical admission patient reports that she used no durable medical equipment. She stated that she doesn't anticipate any needs at discharge. PT/OT Recs: Since medical admission patient reports that she was has been ambulating without assistance. She stated that she doesn't anticipate any needs at discharge. Active Services: Prior to medical admission patient reports that she had no active services in place. She stated that she doesn't anticipate any needs at discharge. Transportation: Prior to medical admission patient reports that she was an active . She stated that she doesn't anticipate any needs at discharge. [facility]ations: [facility] At this time there are no issues with access or affordability at this time. PCP: Aram Calloway [phone] (phone) and [phone] (fax number. Patient reports that her last appointment was in the spring of 2020. She has a new provider Jorge Junior MD  and she has an appt coming up soon. PLAN/COMMENTS:   1) Eliquis co-pay card to be delivered momentarily. 2) Discharge to home with family. SW provided contact information for patient or family to call with any questions. SW will follow and assist as needed. Respectfully submitted,    Diana Bridges Fuel MSW, LISW-S  [address] [facility] Worker  [phone]    Electronically signed by OPAL Akins on 2/14/2021 at 12:04 PM

## 2021-02-14 NOTE — PROGRESS NOTES
Overnight PulseOx study conducted. But unable to download the data due to non availability of the software. The PulseOx monitor is kept separately to be downloaded once the software is installed. RT  is notified.

## 2021-02-14 NOTE — PROGRESS NOTES
Physician Progress Note      Juanito Watts  CSN #:                  [id number]  :                       1960  ADMIT DATE:       2021 12:23 PM  [address] [place] DATE:  RESPONDING  PROVIDER #:        Sylvain Mobley MD          QUERY TEXT:    Pt admitted with Chest pain and A fib/flutter. Pt noted to have elevated BP. If possible, please document in progress notes and discharge summary if you   are evaluating and/or treating any of the following: The medical record reflects the following:  Risk Factors: HTN new onset Afib/flutter  Clinical Indicators: on admit --180/ DBP 97--156, --135  Treatment: In ED Cardizem IV gtt Labetalol 10mg IV x1,    Hypertensive Crisis, unspecified: at least 2 consecutive readings of SBP > 180   mmHg or DBP > 110 mmHg  - Hypertensive Urgency: Hypertensive crisis w/o associated organ dysfunction. S/s may or may not be present, but can include severe headache, SOB,   epistaxis, severe anxiety. Tx: adjustment of oral antihypertensives; IV meds   not usually required. - Hypertensive Emergency: Hypertensive crisis w/ associated organ damage   (stroke, encephalopathy, WANG, MI, angina, acute or decompensated CHF, acute   pulmonary edema, HELLP, etc.). Requires immediate treatment (usually IV meds)   & possible ICU admission. Associated organ dysfunction needs documented. [email or web address]  Options provided:  -- Hypertensive Crisis  -- Hypertensive Emergency  -- Hypertensive Urgency  -- Other - I will add my own diagnosis  -- Disagree - Not applicable / Not valid  -- Disagree - Clinically unable to determine / Unknown  -- Refer to Clinical Documentation Reviewer    PROVIDER RESPONSE TEXT:    This patient has hypertensive urgency.     Query created by: Viji Varela on 2021 8:45 AM Electronically signed by:  Chelsey Benson MD 2/14/2021 9:28 AM

## 2021-02-15 ENCOUNTER — TELEPHONE (OUTPATIENT)
Dept: CARDIOLOGY CLINIC | Age: 61
End: 2021-02-15

## 2021-02-15 NOTE — TELEPHONE ENCOUNTER
Patient discharged over the weekend. Seen by [facility]. Please call patient to schedule f/u with Dr. Xiao Guzman to discuss ablation.

## 2021-02-22 ENCOUNTER — OFFICE VISIT (OUTPATIENT)
Dept: CARDIOLOGY CLINIC | Age: 61
End: 2021-02-22
Payer: COMMERCIAL

## 2021-02-22 VITALS
TEMPERATURE: 97.1 F | SYSTOLIC BLOOD PRESSURE: 140 MMHG | WEIGHT: 167 LBS | HEART RATE: 62 BPM | DIASTOLIC BLOOD PRESSURE: 90 MMHG | BODY MASS INDEX: 25.31 KG/M2 | OXYGEN SATURATION: 98 % | HEIGHT: 68 IN

## 2021-02-22 DIAGNOSIS — I48.0 PAF (PAROXYSMAL ATRIAL FIBRILLATION) (HCC): Primary | ICD-10-CM

## 2021-02-22 DIAGNOSIS — I10 ESSENTIAL HYPERTENSION: ICD-10-CM

## 2021-02-22 PROCEDURE — 1111F DSCHRG MED/CURRENT MED MERGE: CPT | Performed by: INTERNAL MEDICINE

## 2021-02-22 PROCEDURE — 99215 OFFICE O/P EST HI 40 MIN: CPT | Performed by: INTERNAL MEDICINE

## 2021-02-22 PROCEDURE — 93000 ELECTROCARDIOGRAM COMPLETE: CPT | Performed by: INTERNAL MEDICINE

## 2021-02-22 RX ORDER — CARVEDILOL 12.5 MG/1
6.25 TABLET ORAL 2 TIMES DAILY
Qty: 180 TABLET | Refills: 4
Start: 2021-02-22 | End: 2021-06-22 | Stop reason: SDUPTHER

## 2021-02-22 RX ORDER — LISINOPRIL 5 MG/1
5 TABLET ORAL DAILY
Qty: 30 TABLET | Refills: 3 | Status: SHIPPED | OUTPATIENT
Start: 2021-02-22 | End: 2021-04-14 | Stop reason: SDUPTHER

## 2021-02-22 NOTE — PATIENT INSTRUCTIONS
Patient Education        Learning About Atrial Fibrillation  What is atrial fibrillation? Atrial fibrillation (say \"AY-tree-colin myi-ahdx-YUY-shun\") is the most common type of irregular heartbeat (arrhythmia). Normally, the heart beats in a strong, steady rhythm. In atrial fibrillation, a problem with the heart's electrical system causes the two upper parts of the heart (the atria) to quiver, or fibrillate. Your heart rate also may be faster than normal.  Atrial fibrillation can be dangerous because if the heartbeat isn't strong and steady, blood can collect, or pool, in the atria. And pooled blood is more likely to form clots. Clots can travel to the brain, block blood flow, and cause a stroke. Atrial fibrillation can also lead to heart failure. Treatment for atrial fibrillation helps prevent stroke and heart failure. It also helps relieve symptoms. Atrial fibrillation is often caused by another heart problem. It may happen after heart surgery. It may also be caused by other problems, such as an overactive thyroid gland or lung disease. Many people with atrial fibrillation are able to live full and active lives. What are the symptoms? Some people feel symptoms when they have episodes of atrial fibrillation. But other people don't notice any symptoms. If you have symptoms, you may feel:  · A fluttering, racing, or pounding feeling in your chest called palpitations. · Weak or tired. · Dizzy or lightheaded. · Short of breath. · Chest pain. · Confused. You may notice signs of atrial fibrillation when you check your pulse. Your pulse may seem uneven or fast.  What can you expect when you have atrial fibrillation? At first, spells of atrial fibrillation may come on suddenly and last a short time. It may go away on its own or it goes away after treatment. This is called paroxysmal atrial fibrillation. Over time, the spells may last longer and occur more often. They often don't go away on their own.   How is it treated? Treatments can help you feel better and prevent future problems, especially stroke and heart failure. The main types of treatment slow the heart rate, control the heart rhythm, and help prevent stroke. Your treatment will depend on the cause of your atrial fibrillation, your symptoms, and your risk for stroke. · Heart rate treatment. Medicine may be used to slow your heart rate. Your heartbeat may still be irregular. But these medicines keep your heart from beating too fast. They may also help relieve your symptoms. · Heart rhythm treatment. Different treatments may be used to try to stop atrial fibrillation and keep it from returning. They can also relieve symptoms. These treatments include medicine, electrical cardioversion to shock the heart back to a normal rhythm, a procedure called catheter ablation, and heart surgery. · Stroke prevention. You and your doctor can decide how to lower your risk. You may decide to take a blood-thinning medicine called an anticoagulant. How can you live well with it? You can live well and help manage atrial fibrillation by having a heart-healthy lifestyle. This lifestyle may help reduce how often you have episodes of atrial fibrillation. If you are overweight, losing weight can help relieve symptoms. To have a heart-healthy lifestyle:  · Don't smoke. · Eat heart-healthy foods. · Be active. Talk to your doctor about what type and level of exercise is safe for you. · Stay at a healthy weight. Lose weight if you need to. · Avoid alcohol if it triggers symptoms. · Manage other health problems such as high blood pressure, high cholesterol, and diabetes. · Avoid getting sick from the flu. Get a flu shot every year. · Manage stress. Where can you learn more? Go to https://danny.[employer] org and sign in to your [employer] account. Enter D620 in the Decohunt box to learn more about \"Learning About Atrial Fibrillation. \"     If you do not have an account, please click on the \"Sign Up Now\" link. Current as of: December 16, 2019               Content Version: 12.6  © 2006-2020 [employer]. Care instructions adapted under license by [facility] ([facility]). If you have questions about a medical condition or this instruction, always ask your healthcare professional. [address] any warranty or liability for your use of this information. Patient Education        Deciding Between Electrical Cardioversion and Rate Control Medicines for Atrial Fibrillation  How can you decide between electrical cardioversion and rate control medicines for atrial fibrillation? What is atrial fibrillation? Atrial fibrillation (say \"AY-tree-colin jjz-xazj-AGW-shun\") is a kind of uneven heartbeat. It can make you feel lightheaded and dizzy. You may feel weak. It also can make you more likely to have a stroke. Electrical cardioversion can return your heart to a normal rhythm. First you'll get medicines to make you sleepy and control pain. Then your doctor will use patches to send an electric current to your heart. This resets the rhythm of your heart. Not everyone with atrial fibrillation needs this treatment. For some people, taking medicines may be better. Most people can live with an uneven heartbeat. It just has to be kept under control so the heart does not beat too fast.  Use this information to help you and your doctor decide which treatment to choose for atrial fibrillation. What are thornton points about this decision? · Electrical cardioversion can return your heart to a normal rhythm. But the problem can come back. The longer you have had atrial fibrillation, the more likely it is to come back after this treatment. · Cardioversion may not work as well when an uneven heartbeat is caused by another heart disease, such as heart failure. · If your symptoms bother you a lot, you may want to try cardioversion.  But even if it works, you may still need to take blood thinners to prevent a stroke. · If you don't have symptoms, or if they don't bother you much, you can try medicines to slow your heart rate. And you can take blood thinners to prevent a stroke. · Cardioversion does have risks, such as stroke. Discuss the risks with your doctor. Make sure you understand them. · You may have more than one heart problem. Cardioversion doesn't work as well if you have more than one heart problem. Why might you choose electrical cardioversion? · It restores the normal heart rhythm for most people. · The idea of having an electric shock does not bother you. · Your symptoms bother you a lot. · You have had atrial fibrillation just one time. · You do not have other heart problems. · You may not have to take as many medicines. Or you may not need to take them as long. Why might you choose rate-control medicines? · These medicines keep many people from having symptoms. · You prefer to take medicines rather than have an electric shock. · Your symptoms don't bother you much. · If these medicines don't work, you can still try electrical cardioversion. Your decision  Thinking about the facts and your feelings can help you make a decision that is right for you. Be sure you understand the benefits and risks of your options. And think about what else you need to do before you make the decision. Where can you learn more? Go to https://Voya.gemushtaqThe Box.AnSyn. org and sign in to your FAGUO account. Enter S038 in the [facility] box to learn more about \"Deciding Between Electrical Cardioversion and Rate Control Medicines for Atrial Fibrillation. \"     If you do not have an account, please click on the \"Sign Up Now\" link. Current as of: December 16, 2019               Content Version: 12.6  © 3735-3641 [employer]. Care instructions adapted under license by [facility] ([facility]).  If you have questions about a medical condition or this instruction, always ask your healthcare professional. [address] any warranty or liability for your use of this information. Patient Education        Learning About Catheter Ablation for Heart Rhythm Problems  What is catheter ablation? Catheter ablation is a procedure that treats heart rhythm problems. These problems include atrial fibrillation, supraventricular tachycardia (SVT), atrial flutter, and ventricular tachycardia. Your heart should have a strong, steady beat. That beat is controlled by the heart's electrical system. Sometimes that system misfires. This causes a heartbeat that is too fast and isn't steady. Catheter ablation is a way to get into your heart and fix the problem. Ablation is not surgery. How is catheter ablation done? Your doctor inserts thin tubes called catheters into a blood vessel in your groin, arm, or neck. Then your doctor feeds them into the heart. Wires in the catheters help the doctor find the problem areas. Then the doctor uses the wires to send energy to destroy the tiny areas of heart tissue that are causing the problems. It may seem like a bad idea to destroy parts of your heart on purpose. But the areas that are destroyed are very tiny. They should not affect your heart's ability to do its job. You may be awake during the procedure. Or you may be asleep. The doctor will give you medicines to help you feel relaxed and to numb the areas where the catheters go in. You may feel a little uncomfortable, but you should not feel pain. What can you expect after catheter ablation? You may stay overnight in the hospital. How long you stay in the hospital depends on the type of ablation you have. Do not exercise hard or lift anything heavy for a week. You will probably be able to go back to work and to your normal routine in 1 or 2 days.   You may have swelling, bruising, or a small lump around the site where the catheters went into your body. These should go away in 3 to 4 weeks. You may have to take some medicines for a while. Follow-up care is a key part of your treatment and safety. Be sure to make and go to all appointments, and call your doctor if you are having problems. It's also a good idea to know your test results and keep a list of the medicines you take. Where can you learn more? Go to https://chpepiceweb.RetailVector. org and sign in to your EXTRABANCA account. Enter F412 in the Biscotti box to learn more about \"Learning About Catheter Ablation for Heart Rhythm Problems. \"     If you do not have an account, please click on the \"Sign Up Now\" link. Current as of: December 16, 2019               Content Version: 12.6  © 3769-2953 [employer]. Care instructions adapted under license by [facility] ([facility]). If you have questions about a medical condition or this instruction, always ask your healthcare professional. [place] [address] any warranty or liability for your use of this information. If you have any question regarding your atrial fibrillation ablation please contact Dr. Sweta Dumont Nurse Sanchez Graham at [phone]. Atrial Fibrillation Ablation Pre procedure Instructions    As part of your pre procedure work up you will need to get a CT scan of your heart. This scan is completed in order to give Dr. Bela Liang a map of the atrium (chamber of your heart) where he will be doing the ablation. CTA Pre procedure instructions      Date:_________________________________    Time:_________________________________    -Arrive 20 minutes prior to scheduled testing time  -Nothing to eat or drink for at least 4 hours prior to test   -You will need to get lab work 5-7 days prior to this test (Renal panel) to check your kidney function.  You will be receiving intravenous dye for the procedure and the doctor needs to check to make sure your kidneys are functioning properly prior to the test.    Atrial Fibrillation Ablation Pre procedure Instructions    Date: April 20, 2021    Arrive at: 630 AM    Procedure time: 730 AM    The morning of your procedure you will park in the hospital parking lot and report directly to the cath lab to check in.      Pre-Procedure Instructions   1. You will need to fast (nothing to eat or drink) for at least 8 hours prior to procedure. 2. You will need to hold your Eliquis for 12 hours prior to the procedure. 3. If you are a diabetic you will need to hold all diabetic medications including, Metformin the morning of the procedure. If you take Lantus/Levemir only take ½ your normal dose the evening before. 4. Do not use any lotions, creams or perfume the morning of procedure. 5. You will need to complete pre-procedure lab work prior to completing your COVID test which is scheduled for _______________________. 6. Please have a responsible adult to drive you home after procedure, you should go home same day, but there is always a possibility of an overnight stay. 7. Cath lab will provide you with all post procedure instructions  8. A 3 month follow up will be scheduled with Dr. Darryle Marry Nurse practitioner Zechariah Ordoñez CNP post procedure    ________________________________________________________________________________________________  Jerome Quiet will need to complete Pre-Procedure lab work prior to your completing your COVID test   The address for your lab work is:   [address] [place] [address] Parmjit Loredo [place] [address]  Phone: [phone]  The hours are Mon -Fri.  7:00 am - 5:00 pm     No appointment necessary    Please allow for at least 30 mins - 1 hours for your lab work to be completed. _____________________________________________________________________________________________    Your COVID 19 test will be completed day of procedure.

## 2021-02-24 ENCOUNTER — TELEPHONE (OUTPATIENT)
Dept: CARDIOLOGY CLINIC | Age: 61
End: 2021-02-24

## 2021-02-24 NOTE — TELEPHONE ENCOUNTER
Patient stated during OV that the swelling started when she began taking medication upon hospital discharge. She is active in her daily life riding elliptical, treadmill and bike. She should continue her daily activity, elevate legs while seated and wait at least a week after being off both medications. Have her call late next week with an update, thanks.

## 2021-02-24 NOTE — TELEPHONE ENCOUNTER
Pt called in stating she saw Esvin Alexis on 2/22 and she is experiencing some water retention. She states she feels very bloated and her legs/ankles are swollen. She thinks its because of her medications. No SOB/chest pain.     Please call her back at [phone]

## 2021-02-24 NOTE — TELEPHONE ENCOUNTER
Please advise of any recs. Called/spoke to pt, she states that she is follow ing Dr. Love Aponte from [address] on 02/22/2021:  Coreg and Diltiazem for rate control; likely causing fluid retention. For this reason, she can stop Diltiazem and reduce Coreg to 6.25 mg BID for 3 days then stop   She states that today is her second day that she has reduced the Coreg but the swelling is unbearable. She inquired if she can get a Rx for diuretic. If so please send to [facility] in [employer]. Pt states that if call back and get v/m to leave recs on v/m.

## 2021-03-04 ENCOUNTER — TELEPHONE (OUTPATIENT)
Dept: CARDIOLOGY CLINIC | Age: 61
End: 2021-03-04

## 2021-03-04 NOTE — TELEPHONE ENCOUNTER
Called/spoke to pt. She states that her BP is ranging from 115/82 - 150/100. She did go see her general practitioner and suggest that she call our office to see if Dr. Bela Liang thinks the lisinopril dose should be upped. Please advise.

## 2021-03-05 NOTE — TELEPHONE ENCOUNTER
Patient called back and I let her know she is needing lab work done first before considering increasing her lisinopril.    Patient states she is going to have her labs drawn this coming Monday 3/8/21

## 2021-03-05 NOTE — TELEPHONE ENCOUNTER
Called to speak with patient no answer left message instructing to back to discuss BP reading.   BMP completed 3/4/2021 BUN 18 Crea 0.68

## 2021-03-12 NOTE — TELEPHONE ENCOUNTER
Medication Refill    Medication needing refilled:  apixaban (ELIQUIS)       Dosage of the medication:  5 MG TABS tablet       How are you taking this medication (QD, BID, TID, QID, PRN):  Take 1 tablet by mouth 2 times daily    30 or 90 day supply called in: 30 day supply      Which Pharmacy are we sending the medication to?:    [facility] [address], [address] [place] [id number]   Phone:  [phone]  Fax:  [phone]

## 2021-03-23 ENCOUNTER — HOSPITAL ENCOUNTER (OUTPATIENT)
Dept: CT IMAGING | Age: 61
Discharge: HOME | End: 2021-03-23
Payer: COMMERCIAL

## 2021-03-23 DIAGNOSIS — I48.0 PAF (PAROXYSMAL ATRIAL FIBRILLATION) (HCC): ICD-10-CM

## 2021-03-23 PROCEDURE — 6360000004 HC RX CONTRAST MEDICATION: Performed by: INTERNAL MEDICINE

## 2021-03-23 PROCEDURE — 75574 CT ANGIO HRT W/3D IMAGE: CPT

## 2021-03-23 RX ADMIN — IOPAMIDOL 75 ML: 755 INJECTION, SOLUTION INTRAVENOUS at 10:59

## 2021-03-25 ENCOUNTER — TELEPHONE (OUTPATIENT)
Dept: CARDIOLOGY CLINIC | Age: 61
End: 2021-03-25

## 2021-03-25 NOTE — TELEPHONE ENCOUNTER
Called and spoke with patient. She confirmed below information. Instructed her to restart Coreg at 6.25 mg BID. Monitor HR and BP one to two time daily and PRN if she has symptoms of lightheaded, dizziness, presyncope, syncope, heart racing or palpitations. Reviewed pre procedure instructions for upcoming atrial fibrillation ablation on 4/20/2021. Verbalized understanding.

## 2021-03-25 NOTE — TELEPHONE ENCOUNTER
Pt states she has been experiencing a fast heart rate for the past 3 days. She states the lowest resting HR she has gotten was 91 which was yesterday. She did a low impact workout yesterday and after working out, her HR was at 135 and she could not get it to come back down while she was resting while standing so she had to sit down. She said this radiates pain through her chest and back. She feels pretty fatigued. She states this is how she felt before she got diagnosed with afib. She states she took a diltiazem 120 mg 1 tablet by mouth daily on Monday and Wednesday. Pt states she has an ablation on 4/20.     Please give her a call back at [phone]

## 2021-04-01 ENCOUNTER — TELEPHONE (OUTPATIENT)
Dept: CARDIOLOGY CLINIC | Age: 61
End: 2021-04-01

## 2021-04-01 NOTE — TELEPHONE ENCOUNTER
Patient has a hx of symptomatic Afib with a CHADS Vasc of 2 (female gender and hypertension). She is taking Eliquis 5 mg BID and has fibromyalgia for which Tylenol doesn't help. Please notify patient that NSAIDS taken with Anticoagulants may increase the risk of bleeding and therefore Tylenol or Extra Strength Tylenol is preferred. She should check with the physician managing her Fibromyalgia for other recommendations.

## 2021-04-14 ENCOUNTER — APPOINTMENT (OUTPATIENT)
Dept: CT IMAGING | Age: 61
End: 2021-04-14
Payer: COMMERCIAL

## 2021-04-14 ENCOUNTER — HOSPITAL ENCOUNTER (EMERGENCY)
Age: 61
Discharge: HOME | End: 2021-04-14
Payer: COMMERCIAL

## 2021-04-14 VITALS
BODY MASS INDEX: 24.66 KG/M2 | DIASTOLIC BLOOD PRESSURE: 79 MMHG | SYSTOLIC BLOOD PRESSURE: 125 MMHG | WEIGHT: 162.7 LBS | TEMPERATURE: 98.8 F | HEART RATE: 97 BPM | OXYGEN SATURATION: 100 % | RESPIRATION RATE: 17 BRPM | HEIGHT: 68 IN

## 2021-04-14 DIAGNOSIS — I48.0 PAF (PAROXYSMAL ATRIAL FIBRILLATION) (HCC): ICD-10-CM

## 2021-04-14 DIAGNOSIS — S09.90XA CLOSED HEAD INJURY, INITIAL ENCOUNTER: ICD-10-CM

## 2021-04-14 DIAGNOSIS — S00.11XA: ICD-10-CM
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
## 2021-04-14 LAB
ABO/RH: NORMAL
ANION GAP SERPL CALCULATED.3IONS-SCNC: 9 MMOL/L (ref 3–16)
ANTIBODY SCREEN: NORMAL
BASOPHILS ABSOLUTE: 0 K/UL (ref 0–0.2)
BASOPHILS RELATIVE PERCENT: 0.4 %
BUN BLDV-MCNC: 10 MG/DL (ref 7–20)
CALCIUM SERPL-MCNC: 9.4 MG/DL (ref 8.3–10.6)
CHLORIDE BLD-SCNC: 90 MMOL/L (ref 99–110)
CO2: 27 MMOL/L (ref 21–32)
CREAT SERPL-MCNC: 0.6 MG/DL (ref 0.6–1.2)
EOSINOPHILS ABSOLUTE: 0 K/UL (ref 0–0.6)
EOSINOPHILS RELATIVE PERCENT: 0.4 %
GFR AFRICAN AMERICAN: >60
GFR NON-AFRICAN AMERICAN: >60
GLUCOSE BLD-MCNC: 106 MG/DL (ref 70–99)
HCT VFR BLD CALC: 36.9 % (ref 36–48)
HEMOGLOBIN: 12.5 G/DL (ref 12–16)
INR BLD: 1.51 (ref 0.86–1.14)
LYMPHOCYTES ABSOLUTE: 1.2 K/UL (ref 1–5.1)
LYMPHOCYTES RELATIVE PERCENT: 12.1 %
MCH RBC QN AUTO: 30.9 PG (ref 26–34)
MCHC RBC AUTO-ENTMCNC: 33.8 G/DL (ref 31–36)
MCV RBC AUTO: 91.3 FL (ref 80–100)
MONOCYTES ABSOLUTE: 1.1 K/UL (ref 0–1.3)
MONOCYTES RELATIVE PERCENT: 11 %
NEUTROPHILS ABSOLUTE: 7.9 K/UL (ref 1.7–7.7)
NEUTROPHILS RELATIVE PERCENT: 76.1 %
PDW BLD-RTO: 16.7 % (ref 12.4–15.4)
PLATELET # BLD: 229 K/UL (ref 135–450)
PMV BLD AUTO: 8.5 FL (ref 5–10.5)
POTASSIUM SERPL-SCNC: 4.8 MMOL/L (ref 3.5–5.1)
PROTHROMBIN TIME: 17.6 SEC (ref 10–13.2)
RBC # BLD: 4.05 M/UL (ref 4–5.2)
SODIUM BLD-SCNC: 126 MMOL/L (ref 136–145)
WBC # BLD: 10.3 K/UL (ref 4–11)

## 2021-04-14 PROCEDURE — 70450 CT HEAD/BRAIN W/O DYE: CPT

## 2021-04-14 PROCEDURE — 99283 EMERGENCY DEPT VISIT LOW MDM: CPT

## 2021-04-14 PROCEDURE — 72125 CT NECK SPINE W/O DYE: CPT

## 2021-04-14 RX ORDER — LISINOPRIL 5 MG/1
5 TABLET ORAL DAILY
Qty: 30 TABLET | Refills: 0 | Status: SHIPPED | OUTPATIENT
Start: 2021-04-14 | End: 2021-04-14 | Stop reason: SDUPTHER

## 2021-04-14 RX ORDER — LISINOPRIL 5 MG/1
5 TABLET ORAL DAILY
Qty: 90 TABLET | Refills: 3 | Status: SHIPPED | OUTPATIENT
Start: 2021-04-14 | End: 2021-04-15 | Stop reason: SDUPTHER

## 2021-04-14 ASSESSMENT — PAIN DESCRIPTION - LOCATION: LOCATION: HEAD

## 2021-04-14 ASSESSMENT — PAIN DESCRIPTION - ONSET: ONSET: ON-GOING

## 2021-04-14 ASSESSMENT — PAIN DESCRIPTION - PROGRESSION: CLINICAL_PROGRESSION: NOT CHANGED

## 2021-04-14 ASSESSMENT — PAIN DESCRIPTION - PAIN TYPE: TYPE: ACUTE PAIN

## 2021-04-14 NOTE — ED PROVIDER NOTES
[address]  [address] [address]  Dept: [phone]  Loc: [address] ENCOUNTER        This patient was not seen or evaluated by the attending physician. I evaluated this patient, the attending physician was available for consultation. CHIEF COMPLAINT    Chief Complaint   Patient presents with    Fall     pt fell on4/11/2021. pt hit head. pt fel on right orbital.  pt on eloquis       HPI    Dori Lomas is a 63 y.o. female who presents with a head injury. Onset was 3 days ago. She states that she was in her garage, the floor was wet and slippery, she slipped and fell forward hitting her left side of her head. No associated loss of consciousness. Is on Eliquis. She states that she has had progressively worsening periorbital edema and ecchymosis. Therefore came to the ED for further evaluation and treatment. No neck pain or tenderness. No post injury nausea vomiting, syncopal episodes, retrograde amnesia, bowel or bladder dysfunction, saddle anesthesia, history of IV drug use or uncontrolled diabetes. No recent fevers or chills. The patient has no complaints currently for me. The patient came to the ED for further evaluation and treatment. No aggravating or alleviating factors. REVIEW OF SYSTEMS    Neurologic: see HPI, No extremity weakness, no LOC  Musculoskeletal: see HPI  Cardiac: No chest pain or chest injury  Respiratory: No difficulty breathing  GI: No abdominal pain or abdominal injury  : No dysuria or hematuria  General: No fever  All other systems reviewed and are negative.      PAST MEDICAL & SURGICAL HISTORY    Past Medical History:   Diagnosis Date    Anxiety     Atrial fibrillation (HCC)     Fibromyalgia     Fibromyalgia     Hypertension     Hypotension     Hypothyroidism     Irregular heart rate      Past Surgical History:   Procedure Laterality Date    CARDIAC CATHETERIZATION  2019    COLONOSCOPY N/A 2/7/2020    COLONOSCOPY WITH BIOPSY performed by Swapnil Ibarra MD at [address]  2/25/2020    COLONOSCOPY POLYPECTOMY SNARE/COLD BIOPSY performed by Swapnil Ibarra MD at [address] [facility]  [id number]    adenoidectomy    TUBAL LIGATION      UPPER GASTROINTESTINAL ENDOSCOPY N/A 2/7/2020    EGD BIOPSY performed by Swapnil Ibarra MD at [address]  (may include discharge medications prescribed in the ED)  Current Outpatient Rx   Medication Sig Dispense Refill    apixaban (ELIQUIS) 5 MG TABS tablet Take 1 tablet by mouth 2 times daily 60 tablet 5    carvedilol (COREG) 12.5 MG tablet Take 0.5 tablets by mouth 2 times daily 180 tablet 4    lisinopril (PRINIVIL;ZESTRIL) 5 MG tablet Take 1 tablet by mouth daily 30 tablet 3    Cholecalciferol (VITAMIN D) 50 MCG (2000 UT) CAPS capsule Take 2,000 Units by mouth daily      Turmeric 1053 MG TABS Take 1 tablet by mouth daily      polyethylene glycol (GLYCOLAX) 17 g packet Take 17 g by mouth nightly      Omega-3 Fatty Acids (FISH OIL CONCENTRATE PO) Take 1 capsule by mouth daily       Multiple Vitamins-Minerals (THERAPEUTIC MULTIVITAMIN-MINERALS) tablet Take 1 tablet by mouth daily      Ascorbic Acid (VITAMIN C IMMUNE HEALTH PO) Take 500 mg by mouth daily       Coenzyme Q10 (COQ10 PO) Take 1 tablet by mouth daily       levothyroxine (SYNTHROID) 50 MCG tablet Take 50 mcg by mouth Daily      fluticasone (FLONASE) 50 MCG/ACT nasal spray 1 spray by Nasal route daily as needed for Allergies       cetirizine (ZYRTEC) 10 MG tablet Take 10 mg by mouth daily         ALLERGIES    Allergies   Allergen Reactions    Cefdinir Other (See Comments)    Food      avacodos    Tylenol With Codeine #3 [Acetaminophen-Codeine] Other (See Comments)     hallicunations    Codeine Other (See Comments)     HALLUCINATIONS  HALLUCINATIONS         SOCIAL & hemotympanum  Respiratory:  Lungs clear to auscultation bilaterally, no retractions   Cardiovascular:  regular rate, no murmurs  GI:  Soft, nontender, normal bowel sounds  Musculoskeletal: Moves all 4 extremities spontaneously, no gait abnormalities, no edema, no acute deformities  Vascular: Radial and DP pulses 2+ and equal bilaterally  Integument:  Well hydrated, no scalp laceration  Neurologic:  Awake, alert, oriented x4, no aphasia, no slurred speech, CN II-XII intact, normal finger to nose test bilaterally, sensation to light touch intact bilaterally  Psych: Pleasant affect, no hallucinations    RADIOLOGY    CT HEAD WO CONTRAST   Preliminary Result   No evidence of acute intracranial abnormality. CT CERVICAL SPINE WO CONTRAST   Final Result   No acute abnormality of the cervical spine. Degenerative disc disease at C5-6. Multilevel facet arthropathy. ED COURSE & MEDICAL DECISION MAKING    Pertinent Labs & Imaging studies reviewed and interpreted. (See chart for details)    Differential Diagnosis: epidural hematoma, subdural hematoma, parenchymal brain contusion or bleed, subarachnoid hemorrhage, skull fracture, neck fracture or dislocation, other. Neuro exam unremarkable. Given that patient is on anticoagulants and does have raccoon eyes a CT head and cervical spine was obtained. Full radiology read as above. No results found for this visit on 04/14/21. I estimate there is LOW risk for SUBARACHNOID HEMORRHAGE, MENINGITIS, INTRACRANIAL HEMORRHAGE, SUBDURAL HEMATOMA, OR STROKE, thus I consider the discharge disposition reasonable. Tito Whitten and I have discussed the diagnosis and risks, and we agree with discharging home to follow-up with their primary doctor. We also discussed returning to the Emergency Department immediately if new or worsening symptoms occur.  We have discussed the symptoms which are most concerning (e.g., changing or worsening pain, weakness, vomiting, fever) that necessitate immediate return. Discharge Vital Signs:  Blood pressure 125/79, pulse 97, temperature 98.8 °F (37.1 °C), temperature source Tympanic, resp. rate 17, height 5' 8\" (1.727 m), weight 162 lb 11.2 oz (73.8 kg), SpO2 100 %. The patient was instructed to follow up as an outpatient in 1-3 day. The patient was instructed to return to the ED immediately for any new or worsening symptoms. The patient verbalized understanding. FINAL IMPRESSION    1. Fall, initial encounter    2. Closed head injury, initial encounter    3.  Black eye, traumatic, right, initial encounter        PLAN   Discharge with outpatient follow-up (see EMR)    (Please note that this note was completed with a voice recognition program.  Every attempt was made to edit the dictations, but inevitably there remain words that are mis-transcribed.)        ORTEGA Ornelas - JP  04/14/21 6005

## 2021-04-14 NOTE — ED NOTES
D/C: Order noted for d/c. Pt confirmed d/c paperwork have correct name. Discharge and education instructions reviewed with patient. Teach-back successful. Pt verbalized understanding and signed d/c papers. Pt denied questions at this time. No acute distress noted. Patient instructed to follow-up as noted - return to emergency department if symptoms worsen. Patient verbalized understanding. Discharged per EDMD with discharge instructions. Pt discharged to private vehicle. Patient stable upon departure. Thanked patient for choosing [facility]) for care. Provider aware of patient pain at time of discharge.        Laurent Gatica RN  04/14/21 2887

## 2021-04-16 RX ORDER — LISINOPRIL 5 MG/1
5 TABLET ORAL DAILY
Qty: 90 TABLET | Refills: 1 | Status: SHIPPED | OUTPATIENT
Start: 2021-04-16 | End: 2021-11-30 | Stop reason: SDUPTHER

## 2021-04-19 ENCOUNTER — TELEPHONE (OUTPATIENT)
Dept: CARDIOLOGY CLINIC | Age: 61
End: 2021-04-19

## 2021-04-19 NOTE — TELEPHONE ENCOUNTER
Dr. Kayla Gary,   Please review and advise. Patient sustained a fall at home 4/14/21 and was evaluated in the ED. The CT of the head and spine was unremarkable but the appears she did suffer a black eye and facial bruising. Okay for patient to proceed with ablation scheduled tomorrow?

## 2021-04-19 NOTE — TELEPHONE ENCOUNTER
Discussed with Dr. Tayo Martinez, who states if the patient still has significant injuries and bruising will need to reschedule the procedure. Maribel Sanderson, she states she did receive significant injuries after slipping on her garage floor and hitting her head on her bumper. She states she did loose consciousness and still has significant facial bruising along with a large \"knot\" on her forehead. She reports intense muscle spasms yesterday and some mild back pain. Instructed her procedure will need rescheduled and discussed with Dr. Tayo Martinez will need rescheduled in 12 weeks with the extent of her injuries. Instructed Candy YATES will call to reschedule. She v/u. She will be on vacation the week of 6/12/21.

## 2021-04-19 NOTE — TELEPHONE ENCOUNTER
Pt called in stating she had a fall recently and went to the ER. She is pretty banged up and still feeling very sore and she wants to make sure it wont interfere with her ablation tomorrow 4/20.     Please call her at [phone]

## 2021-04-21 NOTE — TELEPHONE ENCOUNTER
Called patient to let her know we will wait at least 12 weeks after her fall. Patient states she will be waiting on our call back with the date and time.

## 2021-04-23 NOTE — TELEPHONE ENCOUNTER
Procedure will need rescheduled for after 7/7/21. Procedure rescheduled for 7/8/21. Reviewed pre procedure instructions and she v/u. instructed to call with any questions. Published on [employer] and e-mail to [place].

## 2021-05-07 ENCOUNTER — TELEPHONE (OUTPATIENT)
Dept: CARDIOLOGY CLINIC | Age: 61
End: 2021-05-07

## 2021-05-07 NOTE — TELEPHONE ENCOUNTER
Pt called stating she is having trouble with her resting heart rate. This has been happening over a week ago. She states it will not go under 98, even with rest. When she is walking up the stairs, she gets a racing heart, some pain and SOB. She states its been difficult to do any work outs because of this.     Please give her a call at [phone]

## 2021-05-07 NOTE — TELEPHONE ENCOUNTER
Called patient who says she has not been feeling good since her fall in April. She has been walking up to 1-2 miles daily and HR gets to the 120's range. Resting HR has remained in the high 90's range and she has been compliant with Coreg 6.25 mg BID. She is symptomatic with SOB, so I suggested she increase Coreg to 12.5 mg BID as this could be a result of her going in and out of Afib. She v/u. In regards to her chest pain, she describes this as chest discomfort that comes and goes when walking up stairs at home and only happens occasionally. She denies any chest discomfort when walking 1-2 miles for exercise. I informed her to continue to monitor for worsening discomfort. She was scheduled for Ablation in April that was re-scheduled for 7/8/21 d/t a fall and head injury. She denies any other symptoms and has agreed to try the increased dose of Coreg and call back in 1 week with an update.

## 2021-05-11 NOTE — PROGRESS NOTES
Daughter calling for mom. Information only. No triage. Is asking about PCR, if it can be saliva only. General information given about covid testing. Daughter verbalized understanding and agrees with plan.     Celena Storm RN  [facility] Nurse Advisor  2:26 PM 5/11/2021   Pt AVS reviewed with patient and all questions answered. Pt IV removed and telemetry box returned to [facility]. Pt ambulated to exit with PCA in stable condition.

## 2021-05-12 NOTE — TELEPHONE ENCOUNTER
Called patient to get a update. Patient states she has been improving. Marysol Ackerman states at first her resting HR was in the 90s but the past couple of days it has been in the 70s and her most recent bp was 115/80. Patient states her workouts do not feel the same as before but she is able to push through them. Marysol Ackerman also mentioned that she has been getting a little lightheaded while getting up.

## 2021-06-22 RX ORDER — CARVEDILOL 6.25 MG/1
6.25 TABLET ORAL 2 TIMES DAILY
Qty: 180 TABLET | Refills: 0 | Status: SHIPPED | OUTPATIENT
Start: 2021-06-22 | End: 2021-07-08 | Stop reason: HOSPADM

## 2021-06-22 NOTE — TELEPHONE ENCOUNTER
I received call from patient reporting he resting HR is running in the 60's and she had already decreased her day time dose of Carvedilol to 6.25 mg. She reports that she will occasionally feel lightheaded. She asked if it would be appropriated to decrease her evening dose of carvedilol to 6.25 mg.  Instructed she may do so updated med list. [facility] per patient request.

## 2021-06-29 ENCOUNTER — TELEPHONE (OUTPATIENT)
Dept: CARDIOLOGY CLINIC | Age: 61
End: 2021-06-29

## 2021-06-29 DIAGNOSIS — I48.0 PAF (PAROXYSMAL ATRIAL FIBRILLATION) (HCC): Primary | ICD-10-CM

## 2021-06-29 NOTE — TELEPHONE ENCOUNTER
Spoke with patient regarding the need for updated labs. She v/u and will come to our office to  the lab orders and complete in the outpatient lab.

## 2021-06-29 NOTE — TELEPHONE ENCOUNTER
Patriica Baker called in this morning wanting to know if she still needs to get blood work done for her ablation next Thursday, she stated she last had blood work done on 04/14. Patricia Baker will need new orders if she is getting new blood work.      You can reach Patricia Baker at [phone]

## 2021-07-02 DIAGNOSIS — I48.0 PAF (PAROXYSMAL ATRIAL FIBRILLATION) (HCC): ICD-10-CM

## 2021-07-02 LAB
ABO/RH: NORMAL
ANION GAP SERPL CALCULATED.3IONS-SCNC: 12 MMOL/L (ref 3–16)
ANTIBODY SCREEN: NORMAL
BUN BLDV-MCNC: 11 MG/DL (ref 7–20)
CALCIUM SERPL-MCNC: 9.8 MG/DL (ref 8.3–10.6)
CHLORIDE BLD-SCNC: 93 MMOL/L (ref 99–110)
CO2: 28 MMOL/L (ref 21–32)
CREAT SERPL-MCNC: 0.5 MG/DL (ref 0.6–1.2)
GFR AFRICAN AMERICAN: >60
GFR NON-AFRICAN AMERICAN: >60
GLUCOSE BLD-MCNC: 89 MG/DL (ref 70–99)
HCT VFR BLD CALC: 39.6 % (ref 36–48)
HEMOGLOBIN: 13.4 G/DL (ref 12–16)
MCH RBC QN AUTO: 30.9 PG (ref 26–34)
MCHC RBC AUTO-ENTMCNC: 33.8 G/DL (ref 31–36)
MCV RBC AUTO: 91.5 FL (ref 80–100)
PDW BLD-RTO: 16 % (ref 12.4–15.4)
PLATELET # BLD: 239 K/UL (ref 135–450)
PMV BLD AUTO: 7.9 FL (ref 5–10.5)
POTASSIUM SERPL-SCNC: 5.6 MMOL/L (ref 3.5–5.1)
RBC # BLD: 4.32 M/UL (ref 4–5.2)
SODIUM BLD-SCNC: 133 MMOL/L (ref 136–145)
WBC # BLD: 4.3 K/UL (ref 4–11)

## 2021-07-07 NOTE — PRE-PROCEDURE INSTRUCTIONS
Called patient to confirm procedure times. Patient to arrive at 0630 for procedure start time of 0730. Reviewed NPO after MN, patient to take meds with sip of water day of procedure.   Venkata Newman RN, CCRN-CSC  Electronically signed by Venkata Newman RN on 7/7/2021 at 4:42 PM

## 2021-07-08 ENCOUNTER — ANESTHESIA (OUTPATIENT)
Dept: CARDIAC CATH/INVASIVE PROCEDURES | Age: 61
End: 2021-07-08

## 2021-07-08 ENCOUNTER — ANESTHESIA EVENT (OUTPATIENT)
Dept: CARDIAC CATH/INVASIVE PROCEDURES | Age: 61
End: 2021-07-08

## 2021-07-08 ENCOUNTER — HOSPITAL ENCOUNTER (OUTPATIENT)
Dept: CARDIAC CATH/INVASIVE PROCEDURES | Age: 61
Discharge: HOME | End: 2021-07-08
Payer: COMMERCIAL

## 2021-07-08 VITALS
DIASTOLIC BLOOD PRESSURE: 92 MMHG | TEMPERATURE: 96.9 F | OXYGEN SATURATION: 97 % | HEART RATE: 82 BPM | RESPIRATION RATE: 18 BRPM | BODY MASS INDEX: 23.04 KG/M2 | HEIGHT: 68 IN | WEIGHT: 152 LBS | SYSTOLIC BLOOD PRESSURE: 130 MMHG

## 2021-07-08 VITALS
SYSTOLIC BLOOD PRESSURE: 153 MMHG | RESPIRATION RATE: 87 BRPM | TEMPERATURE: 96.1 F | DIASTOLIC BLOOD PRESSURE: 109 MMHG | OXYGEN SATURATION: 100 %

## 2021-07-08 DIAGNOSIS — I48.0 PAROXYSMAL ATRIAL FIBRILLATION (HCC): Primary | ICD-10-CM

## 2021-07-08 LAB
ABO/RH: NORMAL
ANION GAP SERPL CALCULATED.3IONS-SCNC: 8 MMOL/L (ref 3–16)
ANTIBODY SCREEN: NORMAL
BUN BLDV-MCNC: 13 MG/DL (ref 7–20)
CALCIUM SERPL-MCNC: 9.5 MG/DL (ref 8.3–10.6)
CHLORIDE BLD-SCNC: 98 MMOL/L (ref 99–110)
CO2: 31 MMOL/L (ref 21–32)
CREAT SERPL-MCNC: 0.5 MG/DL (ref 0.6–1.2)
EKG ATRIAL RATE: 79 BPM
EKG DIAGNOSIS: NORMAL
EKG P AXIS: 56 DEGREES
EKG P-R INTERVAL: 138 MS
EKG Q-T INTERVAL: 406 MS
EKG QRS DURATION: 76 MS
EKG QTC CALCULATION (BAZETT): 465 MS
EKG R AXIS: 56 DEGREES
EKG T AXIS: 92 DEGREES
EKG VENTRICULAR RATE: 79 BPM
GFR AFRICAN AMERICAN: >60
GFR NON-AFRICAN AMERICAN: >60
GLUCOSE BLD-MCNC: 93 MG/DL (ref 70–99)
POC ACT LR: 293 SEC
POC ACT LR: 341 SEC
POC ACT LR: >400 SEC
POTASSIUM SERPL-SCNC: 3.9 MMOL/L (ref 3.5–5.1)
SARS-COV-2, NAAT: NOT DETECTED
SODIUM BLD-SCNC: 137 MMOL/L (ref 136–145)

## 2021-07-08 PROCEDURE — C1894 INTRO/SHEATH, NON-LASER: HCPCS

## 2021-07-08 PROCEDURE — 6360000002 HC RX W HCPCS

## 2021-07-08 PROCEDURE — 2500000003 HC RX 250 WO HCPCS

## 2021-07-08 PROCEDURE — 85347 COAGULATION TIME ACTIVATED: CPT

## 2021-07-08 PROCEDURE — 2709999900 HC NON-CHARGEABLE SUPPLY

## 2021-07-08 PROCEDURE — 93662 INTRACARDIAC ECG (ICE): CPT | Performed by: INTERNAL MEDICINE

## 2021-07-08 PROCEDURE — C1893 INTRO/SHEATH, FIXED,NON-PEEL: HCPCS

## 2021-07-08 PROCEDURE — 6360000004 HC RX CONTRAST MEDICATION

## 2021-07-08 PROCEDURE — 93613 INTRACARDIAC EPHYS 3D MAPG: CPT | Performed by: FAMILY MEDICINE

## 2021-07-08 PROCEDURE — 93623 PRGRMD STIMJ&PACG IV RX NFS: CPT | Performed by: FAMILY MEDICINE

## 2021-07-08 PROCEDURE — C1759 CATH, INTRA ECHOCARDIOGRAPHY: HCPCS

## 2021-07-08 PROCEDURE — 6370000000 HC RX 637 (ALT 250 FOR IP)

## 2021-07-08 PROCEDURE — 6360000002 HC RX W HCPCS: Performed by: INTERNAL MEDICINE

## 2021-07-08 PROCEDURE — 93005 ELECTROCARDIOGRAM TRACING: CPT | Performed by: INTERNAL MEDICINE

## 2021-07-08 PROCEDURE — 86901 BLOOD TYPING SEROLOGIC RH(D): CPT

## 2021-07-08 PROCEDURE — 93662 INTRACARDIAC ECG (ICE): CPT | Performed by: FAMILY MEDICINE

## 2021-07-08 PROCEDURE — 93655 ICAR CATH ABLTJ DSCRT ARRHYT: CPT | Performed by: INTERNAL MEDICINE

## 2021-07-08 PROCEDURE — 86900 BLOOD TYPING SEROLOGIC ABO: CPT

## 2021-07-08 PROCEDURE — 86850 RBC ANTIBODY SCREEN: CPT

## 2021-07-08 PROCEDURE — 93655 ICAR CATH ABLTJ DSCRT ARRHYT: CPT | Performed by: FAMILY MEDICINE

## 2021-07-08 PROCEDURE — 2580000003 HC RX 258

## 2021-07-08 PROCEDURE — 3700000000 HC ANESTHESIA ATTENDED CARE

## 2021-07-08 PROCEDURE — 7100000000 HC PACU RECOVERY - FIRST 15 MIN

## 2021-07-08 PROCEDURE — 80048 BASIC METABOLIC PNL TOTAL CA: CPT

## 2021-07-08 PROCEDURE — 87635 SARS-COV-2 COVID-19 AMP PRB: CPT

## 2021-07-08 PROCEDURE — 93657 TX L/R ATRIAL FIB ADDL: CPT | Performed by: FAMILY MEDICINE

## 2021-07-08 PROCEDURE — C1730 CATH, EP, 19 OR FEW ELECT: HCPCS

## 2021-07-08 PROCEDURE — 93622 COMP EP EVAL L VENTR PAC&REC: CPT | Performed by: INTERNAL MEDICINE

## 2021-07-08 PROCEDURE — 93613 INTRACARDIAC EPHYS 3D MAPG: CPT | Performed by: INTERNAL MEDICINE

## 2021-07-08 PROCEDURE — C1732 CATH, EP, DIAG/ABL, 3D/VECT: HCPCS

## 2021-07-08 PROCEDURE — 6370000000 HC RX 637 (ALT 250 FOR IP): Performed by: INTERNAL MEDICINE

## 2021-07-08 PROCEDURE — 93622 COMP EP EVAL L VENTR PAC&REC: CPT | Performed by: FAMILY MEDICINE

## 2021-07-08 PROCEDURE — 93656 COMPRE EP EVAL ABLTJ ATR FIB: CPT | Performed by: FAMILY MEDICINE

## 2021-07-08 PROCEDURE — 93656 COMPRE EP EVAL ABLTJ ATR FIB: CPT | Performed by: INTERNAL MEDICINE

## 2021-07-08 PROCEDURE — 2720000010 HC SURG SUPPLY STERILE

## 2021-07-08 PROCEDURE — 93623 PRGRMD STIMJ&PACG IV RX NFS: CPT | Performed by: INTERNAL MEDICINE

## 2021-07-08 PROCEDURE — 7100000001 HC PACU RECOVERY - ADDTL 15 MIN

## 2021-07-08 PROCEDURE — 93010 ELECTROCARDIOGRAM REPORT: CPT | Performed by: INTERNAL MEDICINE

## 2021-07-08 PROCEDURE — 2580000003 HC RX 258: Performed by: INTERNAL MEDICINE

## 2021-07-08 PROCEDURE — 93657 TX L/R ATRIAL FIB ADDL: CPT | Performed by: INTERNAL MEDICINE

## 2021-07-08 PROCEDURE — 3700000001 HC ADD 15 MINUTES (ANESTHESIA)

## 2021-07-08 RX ORDER — SODIUM CHLORIDE 0.9 % (FLUSH) 0.9 %
5-40 SYRINGE (ML) INJECTION EVERY 12 HOURS SCHEDULED
Status: DISCONTINUED | OUTPATIENT
Start: 2021-07-08 | End: 2021-07-09 | Stop reason: HOSPADM

## 2021-07-08 RX ORDER — SODIUM CHLORIDE 9 MG/ML
25 INJECTION, SOLUTION INTRAVENOUS PRN
Status: DISCONTINUED | OUTPATIENT
Start: 2021-07-08 | End: 2021-07-09 | Stop reason: HOSPADM

## 2021-07-08 RX ORDER — OXYCODONE HYDROCHLORIDE AND ACETAMINOPHEN 5; 325 MG/1; MG/1
0.5 TABLET ORAL ONCE
Status: DISCONTINUED | OUTPATIENT
Start: 2021-07-08 | End: 2021-07-09 | Stop reason: HOSPADM

## 2021-07-08 RX ORDER — PROTAMINE SULFATE 10 MG/ML
30 INJECTION, SOLUTION INTRAVENOUS
Status: ACTIVE | OUTPATIENT
Start: 2021-07-08 | End: 2021-07-08

## 2021-07-08 RX ORDER — LIDOCAINE HYDROCHLORIDE 10 MG/ML
INJECTION, SOLUTION EPIDURAL; INFILTRATION; INTRACAUDAL; PERINEURAL PRN
Status: DISCONTINUED | OUTPATIENT
Start: 2021-07-08 | End: 2021-07-08 | Stop reason: SDUPTHER

## 2021-07-08 RX ORDER — HEPARIN SODIUM 200 [USP'U]/100ML
INJECTION, SOLUTION INTRAVENOUS CONTINUOUS PRN
Status: DISCONTINUED | OUTPATIENT
Start: 2021-07-08 | End: 2021-07-08 | Stop reason: SDUPTHER

## 2021-07-08 RX ORDER — HEPARIN SODIUM 1000 [USP'U]/ML
INJECTION, SOLUTION INTRAVENOUS; SUBCUTANEOUS PRN
Status: DISCONTINUED | OUTPATIENT
Start: 2021-07-08 | End: 2021-07-08 | Stop reason: SDUPTHER

## 2021-07-08 RX ORDER — DEXAMETHASONE SODIUM PHOSPHATE 4 MG/ML
INJECTION, SOLUTION INTRA-ARTICULAR; INTRALESIONAL; INTRAMUSCULAR; INTRAVENOUS; SOFT TISSUE PRN
Status: DISCONTINUED | OUTPATIENT
Start: 2021-07-08 | End: 2021-07-08 | Stop reason: SDUPTHER

## 2021-07-08 RX ORDER — FUROSEMIDE 10 MG/ML
40 INJECTION INTRAMUSCULAR; INTRAVENOUS ONCE
Status: COMPLETED | OUTPATIENT
Start: 2021-07-08 | End: 2021-07-08

## 2021-07-08 RX ORDER — SODIUM CHLORIDE 0.9 % (FLUSH) 0.9 %
5-40 SYRINGE (ML) INJECTION PRN
Status: DISCONTINUED | OUTPATIENT
Start: 2021-07-08 | End: 2021-07-09 | Stop reason: HOSPADM

## 2021-07-08 RX ORDER — LIDOCAINE HYDROCHLORIDE AND EPINEPHRINE BITARTRATE 20; .01 MG/ML; MG/ML
15 INJECTION, SOLUTION SUBCUTANEOUS SEE ADMIN INSTRUCTIONS
Status: DISCONTINUED | OUTPATIENT
Start: 2021-07-08 | End: 2021-07-09 | Stop reason: HOSPADM

## 2021-07-08 RX ORDER — CARVEDILOL 3.12 MG/1
3.12 TABLET ORAL 2 TIMES DAILY
Status: DISCONTINUED | OUTPATIENT
Start: 2021-07-08 | End: 2021-07-09 | Stop reason: HOSPADM

## 2021-07-08 RX ORDER — MORPHINE SULFATE 2 MG/ML
1 INJECTION, SOLUTION INTRAMUSCULAR; INTRAVENOUS EVERY 5 MIN PRN
Status: DISCONTINUED | OUTPATIENT
Start: 2021-07-08 | End: 2021-07-08

## 2021-07-08 RX ORDER — FLECAINIDE ACETATE 50 MG/1
25 TABLET ORAL EVERY 12 HOURS SCHEDULED
Qty: 60 TABLET | Refills: 3 | Status: SHIPPED | OUTPATIENT
Start: 2021-07-08 | End: 2021-07-27 | Stop reason: SDUPTHER

## 2021-07-08 RX ORDER — PROTAMINE SULFATE 10 MG/ML
INJECTION, SOLUTION INTRAVENOUS PRN
Status: DISCONTINUED | OUTPATIENT
Start: 2021-07-08 | End: 2021-07-08 | Stop reason: SDUPTHER

## 2021-07-08 RX ORDER — VECURONIUM BROMIDE 1 MG/ML
INJECTION, POWDER, LYOPHILIZED, FOR SOLUTION INTRAVENOUS PRN
Status: DISCONTINUED | OUTPATIENT
Start: 2021-07-08 | End: 2021-07-08 | Stop reason: SDUPTHER

## 2021-07-08 RX ORDER — MIDAZOLAM HYDROCHLORIDE 1 MG/ML
INJECTION INTRAMUSCULAR; INTRAVENOUS PRN
Status: DISCONTINUED | OUTPATIENT
Start: 2021-07-08 | End: 2021-07-08 | Stop reason: SDUPTHER

## 2021-07-08 RX ORDER — ONDANSETRON 2 MG/ML
INJECTION INTRAMUSCULAR; INTRAVENOUS PRN
Status: DISCONTINUED | OUTPATIENT
Start: 2021-07-08 | End: 2021-07-08 | Stop reason: SDUPTHER

## 2021-07-08 RX ORDER — FENTANYL CITRATE 50 UG/ML
50 INJECTION, SOLUTION INTRAMUSCULAR; INTRAVENOUS EVERY 5 MIN PRN
Status: DISCONTINUED | OUTPATIENT
Start: 2021-07-08 | End: 2021-07-08

## 2021-07-08 RX ORDER — SODIUM CHLORIDE 9 MG/ML
INJECTION, SOLUTION INTRAVENOUS CONTINUOUS
Status: DISCONTINUED | OUTPATIENT
Start: 2021-07-08 | End: 2021-07-08

## 2021-07-08 RX ORDER — CETIRIZINE HYDROCHLORIDE 10 MG/1
10 TABLET ORAL DAILY
Status: DISCONTINUED | OUTPATIENT
Start: 2021-07-08 | End: 2021-07-09 | Stop reason: HOSPADM

## 2021-07-08 RX ORDER — LEVOTHYROXINE SODIUM 0.05 MG/1
50 TABLET ORAL DAILY
Status: DISCONTINUED | OUTPATIENT
Start: 2021-07-08 | End: 2021-07-09 | Stop reason: HOSPADM

## 2021-07-08 RX ORDER — 0.9 % SODIUM CHLORIDE 0.9 %
1000 INTRAVENOUS SOLUTION INTRAVENOUS
Status: ACTIVE | OUTPATIENT
Start: 2021-07-08 | End: 2021-07-08

## 2021-07-08 RX ORDER — LISINOPRIL 5 MG/1
5 TABLET ORAL DAILY
Status: DISCONTINUED | OUTPATIENT
Start: 2021-07-08 | End: 2021-07-09 | Stop reason: HOSPADM

## 2021-07-08 RX ORDER — GLYCOPYRROLATE 0.2 MG/ML
INJECTION INTRAMUSCULAR; INTRAVENOUS PRN
Status: DISCONTINUED | OUTPATIENT
Start: 2021-07-08 | End: 2021-07-08 | Stop reason: SDUPTHER

## 2021-07-08 RX ORDER — PROPOFOL 10 MG/ML
INJECTION, EMULSION INTRAVENOUS PRN
Status: DISCONTINUED | OUTPATIENT
Start: 2021-07-08 | End: 2021-07-08 | Stop reason: SDUPTHER

## 2021-07-08 RX ORDER — SUCCINYLCHOLINE/SOD CL,ISO/PF 200MG/10ML
SYRINGE (ML) INTRAVENOUS PRN
Status: DISCONTINUED | OUTPATIENT
Start: 2021-07-08 | End: 2021-07-08 | Stop reason: SDUPTHER

## 2021-07-08 RX ORDER — FENTANYL CITRATE 50 UG/ML
INJECTION, SOLUTION INTRAMUSCULAR; INTRAVENOUS PRN
Status: DISCONTINUED | OUTPATIENT
Start: 2021-07-08 | End: 2021-07-08 | Stop reason: SDUPTHER

## 2021-07-08 RX ORDER — ROCURONIUM BROMIDE 10 MG/ML
INJECTION, SOLUTION INTRAVENOUS PRN
Status: DISCONTINUED | OUTPATIENT
Start: 2021-07-08 | End: 2021-07-08

## 2021-07-08 RX ORDER — FLUTICASONE PROPIONATE 50 MCG
1 SPRAY, SUSPENSION (ML) NASAL DAILY PRN
Status: DISCONTINUED | OUTPATIENT
Start: 2021-07-08 | End: 2021-07-09 | Stop reason: HOSPADM

## 2021-07-08 RX ORDER — MORPHINE SULFATE 2 MG/ML
2 INJECTION, SOLUTION INTRAMUSCULAR; INTRAVENOUS EVERY 5 MIN PRN
Status: DISCONTINUED | OUTPATIENT
Start: 2021-07-08 | End: 2021-07-08

## 2021-07-08 RX ORDER — ACETAMINOPHEN 325 MG/1
650 TABLET ORAL EVERY 4 HOURS PRN
Status: DISCONTINUED | OUTPATIENT
Start: 2021-07-08 | End: 2021-07-09 | Stop reason: HOSPADM

## 2021-07-08 RX ORDER — MEPERIDINE HYDROCHLORIDE 25 MG/ML
12.5 INJECTION INTRAMUSCULAR; INTRAVENOUS; SUBCUTANEOUS EVERY 5 MIN PRN
Status: DISCONTINUED | OUTPATIENT
Start: 2021-07-08 | End: 2021-07-08

## 2021-07-08 RX ORDER — OXYCODONE HYDROCHLORIDE AND ACETAMINOPHEN 5; 325 MG/1; MG/1
1 TABLET ORAL EVERY 6 HOURS PRN
Qty: 8 TABLET | Refills: 0 | Status: SHIPPED | OUTPATIENT
Start: 2021-07-08 | End: 2021-07-10

## 2021-07-08 RX ORDER — FENTANYL CITRATE 50 UG/ML
25 INJECTION, SOLUTION INTRAMUSCULAR; INTRAVENOUS EVERY 5 MIN PRN
Status: DISCONTINUED | OUTPATIENT
Start: 2021-07-08 | End: 2021-07-08

## 2021-07-08 RX ORDER — ONDANSETRON 2 MG/ML
4 INJECTION INTRAMUSCULAR; INTRAVENOUS
Status: DISCONTINUED | OUTPATIENT
Start: 2021-07-08 | End: 2021-07-08

## 2021-07-08 RX ORDER — DOBUTAMINE HYDROCHLORIDE 200 MG/100ML
INJECTION INTRAVENOUS CONTINUOUS PRN
Status: DISCONTINUED | OUTPATIENT
Start: 2021-07-08 | End: 2021-07-08 | Stop reason: SDUPTHER

## 2021-07-08 RX ORDER — FLECAINIDE ACETATE 100 MG/1
25 TABLET ORAL EVERY 12 HOURS SCHEDULED
Status: DISCONTINUED | OUTPATIENT
Start: 2021-07-08 | End: 2021-07-09 | Stop reason: HOSPADM

## 2021-07-08 RX ORDER — CARVEDILOL 3.12 MG/1
3.12 TABLET ORAL 2 TIMES DAILY
Qty: 60 TABLET | Refills: 3 | Status: SHIPPED | OUTPATIENT
Start: 2021-07-08 | End: 2021-10-04 | Stop reason: SDUPTHER

## 2021-07-08 RX ADMIN — HEPARIN SODIUM 2000 UNITS: 1000 INJECTION INTRAVENOUS; SUBCUTANEOUS at 09:14

## 2021-07-08 RX ADMIN — LIDOCAINE HYDROCHLORIDE 50 MG: 10 INJECTION, SOLUTION EPIDURAL; INFILTRATION; INTRACAUDAL; PERINEURAL at 07:48

## 2021-07-08 RX ADMIN — FENTANYL CITRATE 100 MCG: 50 INJECTION INTRAMUSCULAR; INTRAVENOUS at 07:45

## 2021-07-08 RX ADMIN — SUGAMMADEX 200 MG: 100 INJECTION, SOLUTION INTRAVENOUS at 10:00

## 2021-07-08 RX ADMIN — HEPARIN SODIUM 4000 UNITS: 1000 INJECTION INTRAVENOUS; SUBCUTANEOUS at 08:50

## 2021-07-08 RX ADMIN — GLYCOPYRROLATE 0.2 MG: 0.2 INJECTION, SOLUTION INTRAMUSCULAR; INTRAVENOUS at 07:55

## 2021-07-08 RX ADMIN — ONDANSETRON 4 MG: 2 INJECTION INTRAMUSCULAR; INTRAVENOUS at 07:55

## 2021-07-08 RX ADMIN — DOBUTAMINE HYDROCHLORIDE 10 MCG/KG/MIN: 200 INJECTION INTRAVENOUS at 09:44

## 2021-07-08 RX ADMIN — ONDANSETRON 4 MG: 2 INJECTION INTRAMUSCULAR; INTRAVENOUS at 10:08

## 2021-07-08 RX ADMIN — FUROSEMIDE 40 MG: 10 INJECTION, SOLUTION INTRAVENOUS at 11:40

## 2021-07-08 RX ADMIN — Medication 120 MG: at 07:49

## 2021-07-08 RX ADMIN — VECURONIUM BROMIDE 10 MG: 1 INJECTION, POWDER, LYOPHILIZED, FOR SOLUTION INTRAVENOUS at 07:57

## 2021-07-08 RX ADMIN — DEXAMETHASONE SODIUM PHOSPHATE 10 MG: 4 INJECTION, SOLUTION INTRAMUSCULAR; INTRAVENOUS at 07:55

## 2021-07-08 RX ADMIN — SODIUM CHLORIDE: 9 INJECTION, SOLUTION INTRAVENOUS at 07:26

## 2021-07-08 RX ADMIN — PROTAMINE SULFATE 150 MG: 10 INJECTION, SOLUTION INTRAVENOUS at 09:56

## 2021-07-08 RX ADMIN — HEPARIN SODIUM 70 ML/HR: 200 INJECTION, SOLUTION INTRAVENOUS at 09:15

## 2021-07-08 RX ADMIN — PROPOFOL 100 MG: 10 INJECTION, EMULSION INTRAVENOUS at 07:48

## 2021-07-08 RX ADMIN — HEPARIN SODIUM 4000 UNITS: 1000 INJECTION INTRAVENOUS; SUBCUTANEOUS at 08:54

## 2021-07-08 RX ADMIN — MIDAZOLAM 2 MG: 1 INJECTION INTRAMUSCULAR; INTRAVENOUS at 07:42

## 2021-07-08 RX ADMIN — APIXABAN 5 MG: 5 TABLET, FILM COATED ORAL at 12:16

## 2021-07-08 RX ADMIN — VECURONIUM BROMIDE 3 MG: 1 INJECTION, POWDER, LYOPHILIZED, FOR SOLUTION INTRAVENOUS at 08:55

## 2021-07-08 ASSESSMENT — PULMONARY FUNCTION TESTS
PIF_VALUE: 13
PIF_VALUE: 16
PIF_VALUE: 15
PIF_VALUE: 16
PIF_VALUE: 16
PIF_VALUE: 15
PIF_VALUE: 16
PIF_VALUE: 15
PIF_VALUE: 16
PIF_VALUE: 13
PIF_VALUE: 3
PIF_VALUE: 16
PIF_VALUE: 15
PIF_VALUE: 16
PIF_VALUE: 15
PIF_VALUE: 15
PIF_VALUE: 27
PIF_VALUE: 16
PIF_VALUE: 16
PIF_VALUE: 15
PIF_VALUE: 15
PIF_VALUE: 16
PIF_VALUE: 15
PIF_VALUE: 16
PIF_VALUE: 16
PIF_VALUE: 1
PIF_VALUE: 13
PIF_VALUE: 13
PIF_VALUE: 15
PIF_VALUE: 16
PIF_VALUE: 15
PIF_VALUE: 16
PIF_VALUE: 16
PIF_VALUE: 15
PIF_VALUE: 15
PIF_VALUE: 16
PIF_VALUE: 15
PIF_VALUE: 15
PIF_VALUE: 16
PIF_VALUE: 16
PIF_VALUE: 15
PIF_VALUE: 0
PIF_VALUE: 16
PIF_VALUE: 15
PIF_VALUE: 16
PIF_VALUE: 16
PIF_VALUE: 0
PIF_VALUE: 16
PIF_VALUE: 15
PIF_VALUE: 0
PIF_VALUE: 1
PIF_VALUE: 15
PIF_VALUE: 15
PIF_VALUE: 16
PIF_VALUE: 0
PIF_VALUE: 15
PIF_VALUE: 15
PIF_VALUE: 16
PIF_VALUE: 15
PIF_VALUE: 16
PIF_VALUE: 15
PIF_VALUE: 16
PIF_VALUE: 16
PIF_VALUE: 15
PIF_VALUE: 16
PIF_VALUE: 1
PIF_VALUE: 15
PIF_VALUE: 16
PIF_VALUE: 15
PIF_VALUE: 2
PIF_VALUE: 16
PIF_VALUE: 16
PIF_VALUE: 15
PIF_VALUE: 16
PIF_VALUE: 15
PIF_VALUE: 16
PIF_VALUE: 16
PIF_VALUE: 15
PIF_VALUE: 15
PIF_VALUE: 16
PIF_VALUE: 15
PIF_VALUE: 15
PIF_VALUE: 16
PIF_VALUE: 16
PIF_VALUE: 13
PIF_VALUE: 16
PIF_VALUE: 0
PIF_VALUE: 16
PIF_VALUE: 16
PIF_VALUE: 14
PIF_VALUE: 16
PIF_VALUE: 16
PIF_VALUE: 0
PIF_VALUE: 16
PIF_VALUE: 15
PIF_VALUE: 16
PIF_VALUE: 0
PIF_VALUE: 2
PIF_VALUE: 15
PIF_VALUE: 1
PIF_VALUE: 16
PIF_VALUE: 16
PIF_VALUE: 27
PIF_VALUE: 16
PIF_VALUE: 15
PIF_VALUE: 16
PIF_VALUE: 16
PIF_VALUE: 0
PIF_VALUE: 15
PIF_VALUE: 15
PIF_VALUE: 0
PIF_VALUE: 15
PIF_VALUE: 15
PIF_VALUE: 18
PIF_VALUE: 15
PIF_VALUE: 16
PIF_VALUE: 1
PIF_VALUE: 15
PIF_VALUE: 13
PIF_VALUE: 2
PIF_VALUE: 15
PIF_VALUE: 16
PIF_VALUE: 15
PIF_VALUE: 15
PIF_VALUE: 0
PIF_VALUE: 16
PIF_VALUE: 22
PIF_VALUE: 16
PIF_VALUE: 1

## 2021-07-08 NOTE — PROCEDURES
karina Covid-19 in the nadia-operative and post-operative states including the recovery window of their procedure. The patient was made aware that karina Covid-19 after a surgical procedure may worsen their prognosis for recovering from the virus and lend to a higher morbidity and or mortality risk. The patient was given the option of postponing their procedure. The patient was also presented reasonable alternatives to the proposed care, treatment, and services. The discussion I have had with the patient encompassed risks, benefits, and side effects related to the alternatives and the risks related to not receiving the proposed care, treatment and services. The patient opted to proceed with the ablation. Written informed consent was signed and placed in the chart. Patient was brought to the EP lab in a fasting non-sedate state. Patient underwent general anesthesia by anesthesia team. The patient was monitored continuously with ECG, pulse oximetry, blood pressure monitoring, and direct observation. No urinary baldwin was placed in order to prevent any hematuria or urinary tract infection. Both groins were prepped in a sterile fashion. We gained access in the right femoral vein. One 8 Lao short sheath for ICE and subsequently for CS catheters were placed in the right femoral vein using modified seldinger technique. Two 8.5F SLO sheaths were introduced into the right femoral vein using modified Seldinger technique. Then a CS cathter was placed inside the coronary sinus without fluoroscopy but with 3-D electroanatomic mapping for recording and mapping of the left atrium. Using ICE we delineated the left pulmonary vein, left atrial appendage,  mitral valve, and right superior and right inferior pulmonary veins, and the trivial amount of pericardial effusion prior to ablation.      Two transeptal punctures were performed under intracardiac echocardiogram, pressure monitoring, and without fluoroscopic guidance. Patient received a bolus of heparin shortly after each transseptal puncture followed by continuous monitoring of the ACT every 15-30 minutes, and additional boluses of heparin during the procedure to keep the ACT between 300-400 sec. We placed both SLO sheaths inside the left atrium. Also an esophageal temperature probe (Apse Temperature Probe 12Fr) that was tied with sutures to an accompanying [employer] quadripolar 6 Trinidadian 5-5-5 electrode spacing catheter was advanced into the esophagus for real-time mapping of the esophagus and careful monitoring of the esophageal temperature during ablation. Using the Penta ray cathter and Carto navigation system a three dimensional electro anatomical mapping of the left atrium, in addition to right and left sided pulmonary vein anatomy was created. During sinus rhythm, we found that all four pulmonary veins (left superior, left inferior, right superior, and right inferior) had PV fascicles, the triggers for the atrial fibrillation. With rapid atrial pacing at 220 ms, we were able to easily induce atrial fibrillation and left atrial flutter with CL 253ms with straight up-down activation, most likely utilizing the roof of the LA. We then proceeded with a pulmonary vein isolation via ablation. Wide area circumferential ablations for the left sided pulmonary veins along with lateral iglesia ablation were performed which resulted in electrical isolation of these pulmonary veins and eradication of the PV fascicles. As we were mid-way through this lesion set, the atrial fibrillation and roof-dependent left atrial flutter terminated to sinus rhythm for the remainder of the procedure. Similarly, wide area circumferential ablations for the right sided pulmonary veins along with posterior iglesia ablation were performed which resulted in electrical isolation of these pulmonary veins and eradication of the PV fascicles.      Given that the patient manifested roof-dependent left atrial flutter, we ablated a linear line along the roof of the left atrium. We then evaluated the left atrium for complex fractionated atrial electrogram, a separate mechanism that would initiate and/or perpetuate atrial fibrillation apart from the pulmonary vein fascicles and antral ablations. We found 3 sites on the septal aspect of the LA and ablated these foci. After ablation was complete, catheters were placed in the left and right atrium, His-position, right ventricle, and left ventricle for pacing and recording. Arrhythmia was attempted to be induced by rapid atrial and ventricular pacing, and there was no induction of atrial tachydysrhythmia. Maximum output (20mA) pacing in the L antrum and R antrum were also performed and showed dissociation from the rest of the left atrium. This was checked circumferentially around the antrums and verified many times. We evaluated the roof line and found that there was complete, bidirectional block. Adenosine bolus of 18mg was also given for each of the 4 pulmonary veins to assess for acute re-connection and to attempt to induce atrial fibrillation. We had adequate adenosine effect (AV blocks of > 3 seconds) and there were no pulmonary fascicles seen in any of the veins nor were there any atrial tachydysrhythmia induced. We then administered dobutamine infusion up to 10 mcg/kg/min in order to achieve at least a 20% increase in heart rate from the basal heart rate to induce any atrial tachydysrhythmia, and there were no atrial tachydysrhythmia induced. We then performed an EP study and programmed stimulation using our CS catheter and ablation catheter to assess the cardiac conduction system and to attempt to induce atrial tachydysrhythmia. The ablation catheter were moved from the left atrium to the left ventricular apex and His bundle position.  His bundle potentials was recorded and pacing and recordings were performed from right atrium, coronary sinus and LV apex with the following results:     Sinus cycle length was 667 msec  KS interval was 118 msec  QRS duration was 82 msec  QT interval was 367 msec  AH interval was 60 msec  HV interval was 41 msec  Pacing from left atrium, 1:1 conduction over AV node with (AV wenckebach) was 310  msec  Pacing from left atrium, AV liam ERP was 600/240 msec   Pacing from LV apex, 1:1 retrograde conduction over AV node (VA wenckebach) was 450 msec  Pacing from LV apex, showed VAERP of 500/350 msec. Then both the ablation, Pentarray, and CS catheters were removed from the body, and all 3 sheaths were removed from the right femoral vein with a figure-of-eight suture that was placed to provide hemostasis while awaiting the downtrending of the ACT. Protamine 150mg IV x 1 was administered to partially reverse the IV heparin that was administered during the atrial fibrillation ablation procedure. Under intracardiac ultrasound guidance, we evaluated for pericardial effusion, and there was no evidence of such. Specimen collected: none    Estimated blood loss: < 50 cc    The patient tolerated the procedure well and there were no complications. Patient was extubated and transferred to the floor in stable condition. Conclusion:     - Pre- and post-procedure diagnoses were paroxysmal atrial fibrillation and roof-dependent left atrial flutter with CL 253ms with straight up-down activation   - Pulmonary vein isolations using wide area circumferential radiofrequency ablation   - Kimberly ablations on both the R and L antrums   - Additional ablation with creation of roof line (for left atrial flutter)  - Ablation of complex fractionated atrial electrogram ablations in the left atrium (for atrial fibrillation)    Plan:   The patient will be monitored and receive the usual post ablation care.  If there are no complications, the patient will be discharged from the hospital either later today or tomorrow with a new prescription for Flecainide but at 25mg BID, pre-admission Coreg but reduced from 6.25mg BID to now 3.125mg BID,  and pre-admission Eliquis 5mg BID. The patient will follow-up with the EP NP in 3 month's time. Thank you for allowing us to participate in the care of your patient. If you have any questions or concerns, please do not hesitate to contact me.     Joy Larsen MD, MS, [facility], [facility]  Cardiac Electrophysiology  [address]  [address] [facility], [address] [place] [address]  Parm[facility] [place] [facility] [address]  [phone]

## 2021-07-08 NOTE — ANESTHESIA POSTPROCEDURE EVALUATION
Department of Anesthesiology  Postprocedure Note    Patient: Aby Rico  MRN: [id number]  YOB: 1960  Date of evaluation: 7/8/2021  Time:  2:16 PM     Procedure Summary     Date: 07/08/21 Room / Location: [facility] Cath Lab    Anesthesia Start: 0740 Anesthesia Stop: 3988    Procedure: WST PATRICK AFIB ABLATION W ANES Diagnosis:     Scheduled Providers:  Responsible Provider: Martina Marvin MD    Anesthesia Type: general ASA Status: 3          Anesthesia Type: general    Roberto Carlos Phase I: Roberto Carlos Score: 10    Roberto Carlos Phase II:      Last vitals: Reviewed and per EMR flowsheets.        Anesthesia Post Evaluation    Patient location during evaluation: PACU  Patient participation: complete - patient participated  Level of consciousness: awake and alert  Pain score: 0  Airway patency: patent  Nausea & Vomiting: no nausea and no vomiting  Complications: no  Cardiovascular status: blood pressure returned to baseline  Respiratory status: acceptable  Hydration status: euvolemic

## 2021-07-08 NOTE — ANESTHESIA PRE PROCEDURE
Department of Anesthesiology  Preprocedure Note       Name:  Heather Vera   Age:  57 y.o.  :  1960                                          MRN:  [id number]         Date:  2021      Surgeon: * Surgery not found *    Procedure:     Medications prior to admission:   Prior to Admission medications    Medication Sig Start Date End Date Taking?  Authorizing Provider   carvedilol (COREG) 6.25 MG tablet Take 1 tablet by mouth 2 times daily 21   Nora Harvey MD   apixaban [facility]) 5 MG TABS tablet Take 1 tablet by mouth 2 times daily 21   Nora Harvey MD   lisinopril (PRINIVIL;ZESTRIL) 5 MG tablet Take 1 tablet by mouth daily 21   Nora Harvey MD   Cholecalciferol (VITAMIN D) 50 MCG (2000 UT) CAPS capsule Take 2,000 Units by mouth daily    Historical Provider, MD   Turmeric 1053 MG TABS Take 1 tablet by mouth daily    Historical Provider, MD   polyethylene glycol (GLYCOLAX) 17 g packet Take 17 g by mouth nightly    Historical Provider, MD   Omega-3 Fatty Acids (FISH OIL CONCENTRATE PO) Take 1 capsule by mouth daily     Historical Provider, MD   Multiple Vitamins-Minerals (THERAPEUTIC MULTIVITAMIN-MINERALS) tablet Take 1 tablet by mouth daily    Historical Provider, MD   Ascorbic Acid (VITAMIN C IMMUNE HEALTH PO) Take 500 mg by mouth daily     Historical Provider, MD   Coenzyme Q10 (COQ10 PO) Take 1 tablet by mouth daily     Historical Provider, MD   levothyroxine (SYNTHROID) 50 MCG tablet Take 50 mcg by mouth Daily    Historical Provider, MD   fluticasone (FLONASE) 50 MCG/ACT nasal spray 1 spray by Nasal route daily as needed for Allergies     Historical Provider, MD   cetirizine (ZYRTEC) 10 MG tablet Take 10 mg by mouth daily    Historical Provider, MD       Current medications:    Current Outpatient Medications   Medication Sig Dispense Refill    carvedilol (COREG) 6.25 MG tablet Take 1 tablet by mouth 2 times daily 180 tablet 0    apixaban (ELIQUIS) 5 MG TABS tablet Take 1 tablet by mouth 2 times daily 180 tablet 2    lisinopril (PRINIVIL;ZESTRIL) 5 MG tablet Take 1 tablet by mouth daily 90 tablet 1    Cholecalciferol (VITAMIN D) 50 MCG (2000 UT) CAPS capsule Take 2,000 Units by mouth daily      Turmeric 1053 MG TABS Take 1 tablet by mouth daily      polyethylene glycol (GLYCOLAX) 17 g packet Take 17 g by mouth nightly      Omega-3 Fatty Acids (FISH OIL CONCENTRATE PO) Take 1 capsule by mouth daily       Multiple Vitamins-Minerals (THERAPEUTIC MULTIVITAMIN-MINERALS) tablet Take 1 tablet by mouth daily      Ascorbic Acid (VITAMIN C IMMUNE HEALTH PO) Take 500 mg by mouth daily       Coenzyme Q10 (COQ10 PO) Take 1 tablet by mouth daily       levothyroxine (SYNTHROID) 50 MCG tablet Take 50 mcg by mouth Daily      fluticasone (FLONASE) 50 MCG/ACT nasal spray 1 spray by Nasal route daily as needed for Allergies       cetirizine (ZYRTEC) 10 MG tablet Take 10 mg by mouth daily       Current Facility-Administered Medications   Medication Dose Route Frequency Provider Last Rate Last Admin    0.9 % sodium chloride infusion   Intravenous Continuous German Ritter MD        sodium chloride flush 0.9 % injection 5-40 mL  5-40 mL Intravenous 2 times per day German Ritter MD        sodium chloride flush 0.9 % injection 5-40 mL  5-40 mL Intravenous PRN German Ritter MD        0.9 % sodium chloride infusion  25 mL Intravenous PRN German Ritter MD           Allergies:     Allergies   Allergen Reactions    Cefdinir Other (See Comments)    Food      avacodos    Tylenol With Codeine #3 [Acetaminophen-Codeine] Other (See Comments)     hallicunations    Codeine Other (See Comments)     HALLUCINATIONS  HALLUCINATIONS         Problem List:    Patient Active Problem List   Diagnosis Code    Chest pain due to myocardial ischemia I25.9    Essential hypertension I10    Abnormal EKG R94.31    Other chest pain R07.89    Flutter-fibrillation (HCC) I48.91, I48.92    A-fib (HCC) I48.91    Fibromyalgia M79.7       Past Medical History:        Diagnosis Date    Anxiety     Atrial fibrillation (HCC)     Fibromyalgia     Fibromyalgia     Hypertension     Hypotension     Hypothyroidism     Irregular heart rate        Past Surgical History:        Procedure Laterality Date    CARDIAC CATHETERIZATION  2019    COLONOSCOPY N/A 2/7/2020    COLONOSCOPY WITH BIOPSY performed by Caroline Garcia MD at [address]  2/25/2020    COLONOSCOPY POLYPECTOMY SNARE/COLD BIOPSY performed by Caroline Garcia MD at [address] [facility]  [id number]    adenoidectomy    TUBAL LIGATION      UPPER GASTROINTESTINAL ENDOSCOPY N/A 2/7/2020    EGD BIOPSY performed by Caroline Garcia MD at [facility] History:    Social History     Tobacco Use    Smoking status: Never Smoker    Smokeless tobacco: Never Used   Substance Use Topics    Alcohol use: Yes     Comment: occasional                                Counseling given: Not Answered      Vital Signs (Current): There were no vitals filed for this visit.                                            BP Readings from Last 3 Encounters:   04/14/21 125/79   02/22/21 (!) 140/90   02/14/21 109/73       NPO Status:                                                                                 BMI:   Wt Readings from Last 3 Encounters:   04/14/21 162 lb 11.2 oz (73.8 kg)   02/22/21 167 lb (75.8 kg)   02/14/21 166 lb 10.7 oz (75.6 kg)     There is no height or weight on file to calculate BMI.    CBC:   Lab Results   Component Value Date    WBC 4.3 07/02/2021    RBC 4.32 07/02/2021    HGB 13.4 07/02/2021    HCT 39.6 07/02/2021    MCV 91.5 07/02/2021    RDW 16.0 07/02/2021     07/02/2021       CMP:   Lab Results   Component Value Date     07/02/2021    K 5.6 07/02/2021    K 4.6 02/14/2021    CL 93 07/02/2021    CO2 28 07/02/2021    BUN 11 07/02/2021    CREATININE 0.5 07/02/2021    GFRAA >60 07/02/2021    AGRATIO 1.2 02/12/2021    LABGLOM >60 07/02/2021    GLUCOSE 89 07/02/2021    PROT 7.3 02/12/2021    CALCIUM 9.8 07/02/2021    BILITOT <0.2 02/12/2021    ALKPHOS 45 02/12/2021    AST 25 02/12/2021    ALT 16 02/12/2021       POC Tests: No results for input(s): POCGLU, POCNA, POCK, POCCL, POCBUN, POCHEMO, POCHCT in the last 72 hours. Coags:   Lab Results   Component Value Date    PROTIME 17.6 04/14/2021    INR 1.51 04/14/2021       HCG (If Applicable): No results found for: PREGTESTUR, PREGSERUM, HCG, HCGQUANT     ABGs: No results found for: PHART, PO2ART, QHK1PGP, ZYE0BKX, BEART, C9PGSMXQ     Type & Screen (If Applicable):  No results found for: LABABO, LABRH    Drug/Infectious Status (If Applicable):  No results found for: HIV, HEPCAB    COVID-19 Screening (If Applicable): No results found for: COVID19        Anesthesia Evaluation  Patient summary reviewed and Nursing notes reviewed no history of anesthetic complications:   Airway: Mallampati: II  TM distance: >3 FB   Neck ROM: full  Mouth opening: > = 3 FB Dental: normal exam         Pulmonary:Negative Pulmonary ROS                              Cardiovascular:  Exercise tolerance: poor (<4 METS),   (+) hypertension:, dysrhythmias: atrial fibrillation,     (-) pacemaker, valvular problems/murmurs, past MI, CAD, CABG/stent,  angina,  CHF, orthopnea, PND,  SUTTON, no pulmonary hypertension and no hyperlipidemia                Neuro/Psych:   (+) neuromuscular disease:,    (-) seizures, TIA, CVA, headaches, psychiatric history and depression/anxiety            GI/Hepatic/Renal:        (-) hiatal hernia, GERD, PUD, hepatitis, liver disease, no renal disease, bowel prep and no morbid obesity       Endo/Other:    (+) hypothyroidism::., no malignancy/cancer. (-) diabetes mellitus, hyperthyroidism, blood dyscrasia, arthritis, no electrolyte abnormalities, no malignancy/cancer               Abdominal:             Vascular: negative vascular ROS. - PVD, DVT and PE.       Other Findings:           Anesthesia Plan      general     ASA 3       Induction: intravenous. MIPS: Prophylactic antiemetics administered. Anesthetic plan and risks discussed with patient. Plan discussed with CRNA. Addis Smith MD   7/8/2021  This pre-anesthesia assessment may be used as a history and physical.    DOS STAFF ADDENDUM:    Pt seen and examined, chart reviewed (including anesthesia, drug and allergy history). No interval changes to history and physical examination. Anesthetic plan, risks, benefits, alternatives, and personnel involved discussed with patient. Patient verbalized an understanding and agrees to proceed.       Addis Smith MD  July 8, 2021  6:59 AM

## 2021-07-08 NOTE — H&P
Cardiac Electrophysiology Consultation   Date: 7/8/2021  Reason for Consultation: Atrial Fibrillation   Consult Requesting Physician: Mark Rocha MD     Chief Complaint:        Chief Complaint   Patient presents with    Follow-Up from [facility]  HPI: Cheli Shah is a 63 y.o. patient with a history of hypotension, hypothyroidism, fibromyalgia, afib and anxiety who presented [facility] with reports of palpitations and chest pain. She was recently treated for upper respiratory infection. She was noted to be in A fib with RVR and spontaneously converted to SR. [address] was initiated prior to discharge. Today, patient reports feeling better now that she is in a normal rhythm. She believes palpitations have been ongoing for many years. The initial episode of palpitations was 3 yrs ago while riding her elliptical when her HR was elevated and after sitting for a few minutes, HR normalized and she resumed her workout. She noticed another episode at least 6 months to 1 year later and this time while riding the elliptical her HR remained normal, but she felt severe palpitations. She remembers having at least 2 more episodes where she was evaluated in the ER. The most recent episode was preceded by neck, chest and back pain for weeks. She was evaluated in ER and found to be in a fib with RVR that spontaneously converted to sinus rhythm. She remains active in her daily life and works out with free weights, on elliptical, treadmill and stationary bike. Monitors sodium and sugar in her diet. Has noticed swelling of her lower extremities since starting new medication and feels as though she is having difficulty losing weight. Reports compliant with medication as prescribed and tolerating.  Today, specifically denies any chest pain, pressure, tightness, nausea, vomiting, diaphoresis, SOB/SUTTON, palpitations, heart racing, dizziness/lightheadedness, orthopnea, PND, LE edema or syncope.     Past Medical History        Past Medical History:   Diagnosis Date    Anxiety      Atrial fibrillation (HCC)      Fibromyalgia      Fibromyalgia      Hypertension      Hypotension      Hypothyroidism      Irregular heart rate              Past Surgical History         Past Surgical History:   Procedure Laterality Date    CARDIAC CATHETERIZATION   2019    COLONOSCOPY N/A 2/7/2020     COLONOSCOPY WITH BIOPSY performed by Skye Blair MD at [facility]   2/25/2020     COLONOSCOPY POLYPECTOMY SNARE/COLD BIOPSY performed by Skye Blair MD at [facility] [id number]     adenoidectomy    TUBAL LIGATION        UPPER GASTROINTESTINAL ENDOSCOPY N/A 2/7/2020     EGD BIOPSY performed by Skye Blair MD at [address] [place] [address]: Allergies   Allergen Reactions    Cefdinir Other (See Comments)    Food         avacodos    Tylenol With Codeine #3 [Acetaminophen-Codeine] Other (See Comments)       hallicunations    Codeine Other (See Comments)       HALLUCINATIONS  HALLUCINATIONS            Medication:   Home Medications           Prior to Admission medications    Medication Sig Start Date End Date Taking?  Authorizing Provider   apixaban (ELIQUIS) 5 MG TABS tablet Take 1 tablet by mouth 2 times daily 2/14/21   Yes Dixie Gillette MD   dilTIAZem (CARDIZEM CD) 120 MG extended release capsule Take 1 capsule by mouth daily 2/15/21   Yes Dixie Gillette MD   Cholecalciferol (VITAMIN D) 50 MCG (2000 UT) CAPS capsule Take 2,000 Units by mouth daily     Yes Historical Provider, MD   Turmeric 1053 MG TABS Take 1 tablet by mouth daily     Yes Historical Provider, MD   polyethylene glycol (GLYCOLAX) 17 g packet Take 17 g by mouth nightly     Yes Historical Provider, MD   carvedilol (COREG) 12.5 MG tablet TAKE 1 TABLET TWICE A DAY WITH MEALS 2/20/20   Yes Sugar Donovan MD   Omega-3 Fatty Acids (FISH OIL CONCENTRATE PO) Take 1 capsule by mouth daily      Yes Historical Provider, MD   Multiple Vitamins-Minerals (THERAPEUTIC MULTIVITAMIN-MINERALS) tablet Take 1 tablet by mouth daily     Yes Historical Provider, MD   Ascorbic Acid (VITAMIN C IMMUNE HEALTH PO) Take 500 mg by mouth daily      Yes Historical Provider, MD   Coenzyme Q10 (COQ10 PO) Take 1 tablet by mouth daily      Yes Historical Provider, MD   levothyroxine (SYNTHROID) 50 MCG tablet Take 50 mcg by mouth Daily     Yes Historical Provider, MD   fluticasone (FLONASE) 50 MCG/ACT nasal spray 1 spray by Nasal route daily as needed for Allergies      Yes Historical Provider, MD   cetirizine (ZYRTEC) 10 MG tablet Take 10 mg by mouth daily     Yes Historical Provider, MD            Social History:   reports that she has never smoked. She has never used smokeless tobacco. She reports current alcohol use. She reports that she does not use drugs.         Family History:  family history includes Dementia in her mother; High Blood Pressure in her mother. Reviewed. Denies family history of sudden cardiac death, arrhythmia, premature CAD     Review of System:     · General ROS: negative for - chills, fever   · Psychological ROS: negative for - anxiety or depression  · Ophthalmic ROS: negative for - eye pain or loss of vision  · ENT ROS: negative for - epistaxis, headaches, nasal discharge, sore throat   · Allergy and Immunology ROS: negative for - hives, nasal congestion   · Hematological and Lymphatic ROS: negative for - bleeding problems, blood clots, bruising or jaundice  · Endocrine ROS: negative for - skin changes, temperature intolerance or unexpected weight changes  · Respiratory ROS: negative for - cough, hemoptysis, pleuritic pain, SOB, sputum changes or wheezing  · Cardiovascular ROS: Per HPI.    · Gastrointestinal ROS: negative for - abdominal pain, blood in stools, diarrhea, hematemesis, melena, nausea/vomiting or swallowing difficulty/pain  · Genito-Urinary ROS: negative for - dysuria or incontinence  · Musculoskeletal ROS: negative for - joint swelling or muscle pain  · Neurological ROS: negative for - confusion, dizziness, gait disturbance, headaches, numbness/tingling, seizures, speech problems, tremors, visual changes or weakness  · Dermatological ROS: negative for - rash     Physical Examination:      Vitals:     02/22/21 1354   BP: (!) 140/90   Pulse:     Temp:     SpO2:           · Constitutional: Oriented. No distress. · Head: Normocephalic and atraumatic. · Mouth/Throat: Oropharynx is clear and moist.   · Eyes: Conjunctivae normal. EOM are normal.   · Neck: Normal range of motion. Neck supple. No rigidity. No JVD present. · Cardiovascular: Normal rate, regular rhythm, S1&S2 and intact distal pulses. · Pulmonary/Chest: Bilateral respiratory sounds. No wheezes. No rhonchi. · Abdominal: Soft. Bowel sounds present. No distension, No tenderness. · Musculoskeletal: No tenderness. No edema    · Lymphadenopathy: Has no cervical adenopathy. · Neurological: Alert and oriented. Cranial nerve appears intact, No Gross deficit   · Skin: Skin is warm and dry. No rash noted. · Psychiatric: Has a normal mood, affect and behavior      Labs:  Reviewed.      ECG: reviewed, Sinus  rhythm with v-rate of 65 bpm with QRS duration 94 ms. No pathologic Q waves, ventricular pre-excitation, or QT prolongation.      Studies:   1. Event monitor:         2. Echo: 2/26/19   Normal left ventricular size, wall thickness and wall motion with an   estimated ejection fraction of 55-60%.   Normal left atrial size.   The right ventricle is normal in size and function.   There is trivial tricuspid regurgitation with the systolic pulmonary artery   pressure (SPAP) estimated at 31 mmHg     3. Stress Test:  9/26/17 @ [facility]  Normal exercise treadmill stress test  Patient's duke score equals 9 which is low risk  No perfusion defect. EF 75%.       4.  Cath: 2/27/19 @ [facility]   FINDINGS  1.  Left dominant coronary arterial potent anti-arrhythmic medication there is limited long term efficacy (clinical studies have shown that 40% of patients remain atrial fibrillation-free after 4 years of follow-up after starting one of the more powerful anti-arrhythmic medication (amiodarone), and, if extrapolated, may have further diminishing success as time goes on). Atrial fibrillation ablation is a potentially curative therapy with very reasonable success rate after a first time procedure and with improving success rates with subsequent procedures.      The risks, benefits and alternatives of the atrial fibrillation ablation procedure were discussed with the patient. The risks including, but not limited to, bleeding, infection, radiation exposure, injury to vascular, cardiac and surrounding structures (including pneumothorax), stroke, cardiac perforation, tamponade, need for emergent open heart surgery, need for pacemaker implantation, injury to the phrenic nerve, injury to the esophagus, myocardial infarction and death were discussed in detail. The patient was also counseled at length about the risks of karina Covid-19 in the nadia-operative and post-operative states including the recovery window of their procedure. The patient was made aware that karina Covid-19 after a surgical procedure may worsen their prognosis for recovering from the virus and lend to a higher morbidity and or mortality risk. The patient was given the option of postponing their procedure.      I spent 40 minutes face to face with the patient, with greater than 50% of that time spent in counseling on the above.     The patient opted to proceed with the atrial fibrillation ablation. We will schedule for a radiofrequency ablation with [employer] Navigation system with a PATRICK procedure immediately prior to this ablation. A cardiac CTA will be ordered for pulmonary vein mapping prior to this procedure. We will hold Eliquis for 12 hours prior to procedure.  We will order BMP, CBC, PT/INR, and Type & Screen prior to the procedure.      Essential hypertension  BP is elevated on today's exam  Medications causing fluid retention and will be discontinued. Stop Cardizem now and reduce Coreg to 6.25 mg BID for 3 days then stop  Start Lisinopril 5 mg daily   Encouraged low sodium diet      Thank you for allowing me to participate in the care of Johan Merino. All questions and concerns were addressed to the patient/family. Alternatives to my treatment were discussed.      I have reviewed the history and physical and examined the patient and find no relevant changes. I have reviewed with the patient and/or family the risks and benefits to the proposed procedure. The patient was presented with the option of postponing the proposed procedure. The patient was also presented reasonable alternatives to the proposed care, treatment, and services.  The discussion I have had with the patient encompassed risks, benefits, and side effects related to the alternatives and the risks related to not receiving the proposed care, treatment and services.      Gale Doan MD, [address], [facility] [address] Electrophysiology  [address], [address] [place] [address]  [facility] [address]  [phone]

## 2021-07-09 ENCOUNTER — TELEPHONE (OUTPATIENT)
Dept: CARDIOLOGY CLINIC | Age: 61
End: 2021-07-09

## 2021-07-09 DIAGNOSIS — M79.89 RIGHT LEG SWELLING: Primary | ICD-10-CM

## 2021-07-09 NOTE — TELEPHONE ENCOUNTER
Jalyn Salgado called in this afternoon wanting to speak with Alley Dash RN in regards to the pictures she sent through my chart.      You can reach Jalyn Salgado at [phone]

## 2021-07-09 NOTE — TELEPHONE ENCOUNTER
Spoke with Naya Bundy regarding her symptoms. The pain has improved but swelling has worsened since yesterday. Discussed with her that it can be normal to experience some swelling and this should resolve with time. There is no redness or warmth at the area and I encouraged her to continue to monitor. She may call the on-call physician over the weekend with any concerns and will call Monday with an update.

## 2021-07-09 NOTE — TELEPHONE ENCOUNTER
Micah Key had an ablation yesterday and she stated her leg is excessively swelling and she isn't sure if it's to much. She stated the swelling is worse today than yesterday. Micah Key said in her inner thigh is where it is most swollen.     You can reach Micah Key at [phone]

## 2021-07-09 NOTE — TELEPHONE ENCOUNTER
Pt reports bruising at site and swelling in her extremity post ablation. The swelling is worse than when she got home yesterday. Her pain has improved. Denies coldness of extremity, bleeding and hematoma. Pt states that Candido Borja may have done too much. \" Instructed to rest, elevate leg and apply cold compress. Pt states that she took a photo and will send in Nexopiat. She also states that her BP was high today at 158/105 and was also high when she left the hospital. Advised to continue to monitor BP. Will await photo for review and make further recommendations.

## 2021-07-13 NOTE — TELEPHONE ENCOUNTER
Livier Pleitez called in this morning stating that she noticed that there is a lump where they went in for her procedure. States it is bigger than yesterday about the size of a quarter. It also has bruising around it. Livier Pleitez can be reached at [phone].

## 2021-07-13 NOTE — TELEPHONE ENCOUNTER
Dr. Lisa Robles,    Patient called into office reporting that she has noticed increase in swelling and brusing in the groin site about size of quarter accessed for her atrial fibrillation ablation on 7/9/2021. Please advise if ok to order a US to r/o a pseudoaneurysm.     Thanks,  Ronnie Marquez RN

## 2021-07-14 ENCOUNTER — HOSPITAL ENCOUNTER (OUTPATIENT)
Dept: VASCULAR LAB | Age: 61
Discharge: HOME | End: 2021-07-14
Payer: COMMERCIAL

## 2021-07-14 ENCOUNTER — TELEPHONE (OUTPATIENT)
Dept: CARDIOLOGY CLINIC | Age: 61
End: 2021-07-14

## 2021-07-14 DIAGNOSIS — M79.89 RIGHT LEG SWELLING: ICD-10-CM

## 2021-07-14 PROCEDURE — 93926 LOWER EXTREMITY STUDY: CPT

## 2021-07-14 NOTE — TELEPHONE ENCOUNTER
Spoke with Dr. Karma Singh and he advised the [address] did NOT show a pseudoaneurysm, but did show a Hematoma which we would expect will resolve on its own but to instruct patient to monitor the area and to call with any increase in the size of the hematoma. He asked that I check on he on Friday to see how she is doing. I called and spoke with patient and advised of the above information and instructed he to contact the office right away if she is noticing an increase in the size of the hematoma. Verbalized understanding.

## 2021-07-14 NOTE — TELEPHONE ENCOUNTER
Patient has been scheduled for u/s here at the [address] [place] [address] location for [address] [place] [address] and spoke with patient giving her this information, patient verbalized understanding.

## 2021-07-23 NOTE — TELEPHONE ENCOUNTER
Called and spoke with patient. She states her resting HR 80-90's. She states she was previously on Coreg 6.25 mg daily. Advised the the HR is still within the normal range, but is she wanted I could send a message to Dr. Orlin Reece to see if he would be agreeable. She stated she is ok with current dosing. She said whenever she gets up in the morning it seems to be smaller but once she is up moving it seems slightly bigger and then it decreases again. I advised that this is ok and continue to monitor and call back with any changes.

## 2021-07-23 NOTE — TELEPHONE ENCOUNTER
Matilde Almazan called in this afternoon wanting to speak with Candy. She stated she was calling to give her an update. Matilde Almazan said her hematoma has stayed the same and the size hasn't reduced. She said whenever she gets up in the morning it seems to be smaller but once she is up moving it seems bigger. Matilde Almazan also said her resting HR has been high.        You can each Matilde Almazan at [phone]

## 2021-10-04 RX ORDER — CARVEDILOL 3.12 MG/1
3.12 TABLET ORAL 2 TIMES DAILY
Qty: 180 TABLET | Refills: 2 | Status: SHIPPED | OUTPATIENT
Start: 2021-10-04 | End: 2021-10-20 | Stop reason: ALTCHOICE

## 2021-10-15 ENCOUNTER — HOSPITAL ENCOUNTER (OUTPATIENT)
Dept: WOMENS IMAGING | Age: 61
Discharge: HOME | End: 2021-10-15
Payer: COMMERCIAL

## 2021-10-15 DIAGNOSIS — Z12.31 VISIT FOR SCREENING MAMMOGRAM: ICD-10-CM

## 2021-10-15 PROCEDURE — 77063 BREAST TOMOSYNTHESIS BI: CPT

## 2021-10-20 ENCOUNTER — OFFICE VISIT (OUTPATIENT)
Dept: CARDIOLOGY CLINIC | Age: 61
End: 2021-10-20
Payer: COMMERCIAL

## 2021-10-20 VITALS
BODY MASS INDEX: 23.04 KG/M2 | WEIGHT: 152 LBS | HEIGHT: 68 IN | DIASTOLIC BLOOD PRESSURE: 78 MMHG | OXYGEN SATURATION: 90 % | SYSTOLIC BLOOD PRESSURE: 126 MMHG | HEART RATE: 97 BPM

## 2021-10-20 DIAGNOSIS — I48.0 PAROXYSMAL ATRIAL FIBRILLATION (HCC): Primary | ICD-10-CM

## 2021-10-20 DIAGNOSIS — I48.92 LEFT ATRIAL FLUTTER BY ELECTROCARDIOGRAM (HCC): ICD-10-CM

## 2021-10-20 PROCEDURE — 93000 ELECTROCARDIOGRAM COMPLETE: CPT | Performed by: NURSE PRACTITIONER

## 2021-10-20 PROCEDURE — 99214 OFFICE O/P EST MOD 30 MIN: CPT | Performed by: NURSE PRACTITIONER

## 2021-10-20 ASSESSMENT — ENCOUNTER SYMPTOMS
ABDOMINAL PAIN: 0
COLOR CHANGE: 0
SORE THROAT: 0
TROUBLE SWALLOWING: 0
WHEEZING: 0
NAUSEA: 0
BACK PAIN: 0
BLOOD IN STOOL: 0
VOMITING: 0
SINUS PRESSURE: 0
DIARRHEA: 0
CONSTIPATION: 0
SHORTNESS OF BREATH: 0
COUGH: 0

## 2021-10-20 NOTE — PROGRESS NOTES
[facility]   Electrophysiology      Date: 10/20/2021    Primary Cardiologist: Rossy Torres MD  PCP: Jaja Caal MD     Chief Complaint:   Chief Complaint   Patient presents with    3 Month Follow-Up     s/p ablation     History of Present Illness:    I saw Ember Ramos in the office for electrophysiology follow up today. She is a 57 y.o. female with a past medical history of hypertension, hypothyroidism, fibromyalgia, afib and anxiety who presented to [facility] [place] 2/2021 with reports of palpitations and chest pain. She was recently treated for upper respiratory infection. She was noted to be in A fib with RVR and spontaneously converted to [facility] was initiated prior to discharge. She underwent radiofrequency ablation of atrial fibrillation pulmonary vein isolation, roof line for left atrial flutter and CAFE (for A fib) ablation on 7/8/21 with Dr. Floyd Palafox. She was started on flecainide 25 mg twice daily and Coreg was decreased to 3.125 mg twice daily. She did have a groin hematoma post procedure. She presents today for procedure follow-up. She has been feeling better since her ablation. She will have rare, brief palpitations but nothing prolonged like when she was in A fib. Denies any dyspnea or chest pain. No syncope. No edema. No bleeding issues. Allergies: Allergies   Allergen Reactions    Cefdinir Other (See Comments)    Food      avacodos    Tylenol With Codeine #3 [Acetaminophen-Codeine] Other (See Comments)     hallicunations    Codeine Other (See Comments)     HALLUCINATIONS  HALLUCINATIONS       Home Medications:  Prior to Visit Medications    Medication Sig Taking?  Authorizing Provider   apixaban (ELIQUIS) 5 MG TABS tablet Take 1 tablet by mouth 2 times daily Yes Cristi Carlos MD   lisinopril (PRINIVIL;ZESTRIL) 5 MG tablet Take 1 tablet by mouth daily Yes Cristi Carlos MD   Cholecalciferol (VITAMIN D) 50 MCG (2000 UT) CAPS capsule Take 2,000 Units by mouth daily Yes Historical Provider, MD   Turmeric 1053 MG TABS Take 1 tablet by mouth daily Yes Historical Provider, MD   polyethylene glycol (GLYCOLAX) 17 g packet Take 17 g by mouth nightly Yes Historical Provider, MD   Omega-3 Fatty Acids (FISH OIL CONCENTRATE PO) Take 1 capsule by mouth daily  Yes Historical Provider, MD   Ascorbic Acid (VITAMIN C IMMUNE HEALTH PO) Take 500 mg by mouth daily  Yes Historical Provider, MD   Coenzyme Q10 (COQ10 PO) Take 1 tablet by mouth daily  Yes Historical Provider, MD   levothyroxine (SYNTHROID) 50 MCG tablet Take 50 mcg by mouth Daily Yes Historical Provider, MD   fluticasone (FLONASE) 50 MCG/ACT nasal spray 1 spray by Nasal route daily as needed for Allergies  Yes Historical Provider, MD   cetirizine (ZYRTEC) 10 MG tablet Take 10 mg by mouth daily Yes Historical Provider, MD   Multiple Vitamins-Minerals (THERAPEUTIC MULTIVITAMIN-MINERALS) tablet Take 1 tablet by mouth daily  Patient not taking: Reported on 10/20/2021  Historical Provider, MD        Past Medical History:  Past Medical History:   Diagnosis Date    Anxiety     Atrial fibrillation ([facility] [address]     Fibromyalgia     Fibromyalgia     Hypertension     Hypotension     Hypothyroidism     Irregular heart rate        Past Surgical History:   Past Surgical History:   Procedure Laterality Date    CARDIAC CATHETERIZATION  2019    COLONOSCOPY N/A 2/7/2020    COLONOSCOPY WITH BIOPSY performed by Al Merino MD at [address] [facility]  2/25/2020    COLONOSCOPY POLYPECTOMY SNARE/COLD BIOPSY performed by Al Merino MD at [address] [facility]  [id number]    adenoidectomy    TUBAL LIGATION      UPPER GASTROINTESTINAL ENDOSCOPY N/A 2/7/2020    EGD BIOPSY performed by Al Merino MD at [address] [facility] History:   reports that she has never smoked. She has never used smokeless tobacco. She reports current alcohol use. She reports that she does not use drugs.      Family History: Problem Relation Age of Onset    High Blood Pressure Mother     Dementia Mother        Review of Systems   Constitutional: Negative for chills, fatigue, fever and unexpected weight change. HENT: Negative for congestion, hearing loss, sinus pressure, sore throat and trouble swallowing. Respiratory: Negative for cough, shortness of breath and wheezing. Cardiovascular: Positive for palpitations (rare). Negative for chest pain and leg swelling. Gastrointestinal: Negative for abdominal pain, blood in stool, constipation, diarrhea, nausea and vomiting. Genitourinary: Negative for hematuria. Musculoskeletal: Negative for arthralgias, back pain, gait problem and myalgias. Skin: Negative for color change, rash and wound. Neurological: Negative for dizziness, seizures, syncope, speech difficulty, weakness and light-headedness. Hematological: Does not bruise/bleed easily. Physical Examination:  Vitals:    10/20/21 1317   BP: 126/78   Pulse: 97   SpO2: 90%      Wt Readings from Last 3 Encounters:   10/20/21 152 lb (68.9 kg)   07/08/21 152 lb (68.9 kg)   04/14/21 162 lb 11.2 oz (73.8 kg)       Physical Exam  Vitals reviewed. Constitutional:       General: She is not in acute distress. Appearance: Normal appearance. HENT:      Head: Normocephalic and atraumatic. Nose: Nose normal.      Mouth/Throat:      Mouth: Mucous membranes are moist.   Eyes:      Conjunctiva/sclera: Conjunctivae normal.      Pupils: Pupils are equal, round, and reactive to light. Cardiovascular:      Rate and Rhythm: Normal rate and regular rhythm. Heart sounds: No murmur heard. No friction rub. No gallop. Pulmonary:      Effort: No respiratory distress. Breath sounds: No wheezing, rhonchi or rales. Abdominal:      General: Abdomen is flat. Bowel sounds are normal.      Palpations: Abdomen is soft. Musculoskeletal:         General: Normal range of motion. Right lower leg: No edema. Left lower leg: No edema. Skin:     General: Skin is warm and dry. Findings: No bruising. Neurological:      General: No focal deficit present. Mental Status: She is alert and oriented to person, place, and time. Motor: No weakness. Psychiatric:         Mood and Affect: Mood normal.         Behavior: Behavior normal.          Pertinent labs, diagnostic, device, and imaging results reviewed as a part of this visit    LABS    CBC:   Lab Results   Component Value Date    WBC 4.3 07/02/2021    HGB 13.4 07/02/2021    HCT 39.6 07/02/2021    MCV 91.5 07/02/2021     07/02/2021     BMP:   Lab Results   Component Value Date    CREATININE 0.5 (L) 07/08/2021    BUN 13 07/08/2021     07/08/2021    K 3.9 07/08/2021    CL 98 (L) 07/08/2021    CO2 31 07/08/2021     Estimated Creatinine Clearance: 118 mL/min (A) (based on SCr of 0.5 mg/dL (L)). No results found for: BNP    Thyroid: No results found for: TSH, Q9LCUJW, E2UKYJT, THYROIDAB  Lipid Panel:   Lab Results   Component Value Date    CHOL 233 02/26/2019     02/26/2019    TRIG 61 02/26/2019     LFTs:  Lab Results   Component Value Date    ALT 16 02/12/2021    AST 25 02/12/2021    ALKPHOS 45 02/12/2021    BILITOT <0.2 02/12/2021     Coags:   Lab Results   Component Value Date    PROTIME 17.6 (H) 04/14/2021    INR 1.51 (H) 04/14/2021       ECG: 10/20/2021   SR at 86 BPM. Non-specific ST-T wave changes. Echo: 2/26/19   Normal left ventricular size, wall thickness and wall motion with an   estimated ejection fraction of 55-60%. Normal left atrial size. The right ventricle is normal in size and function. There is trivial tricuspid regurgitation with the systolic pulmonary artery   pressure (SPAP) estimated at 31 mmHg    Stress test: 9/26/17 @ [facility]  Normal exercise treadmill stress test  Patient's duke score equals 9 which is low risk  No perfusion defect. EF 75%.     Cath: 2/27/19 @ [facility]   FINDINGS  1.  Left dominant coronary arterial circulation with no angiographic  evidence of coronary atherosclerosis. 2.  Left ventricular end-diastolic pressure of approximately 12 mmHg. 3.  Normal left ventricular systolic function.  LV ejection fraction of  55%. 4.  No gradient across the aortic valve on pullback to suggest aortic  stenosis. 5.  Significant systemic hypertension. EP Procedures:  1. PVI, roof line (for L flutter), CAFE (for A fib) RFA on 7/8/21, Dr. Oswald Person:    Paroxysmal atrial fibrillation   - first seen on EKG 2/12/21 but has noted palpitations since 2018   - s/p PVI, CAFE RFA on 7/8/21 - had a groin hematoma post-op, now resolved   - CHADS2-VASc 2 (gender, HTN) on Eliquis 5mg BID   - EKG today with SR   - stop flecainide, Coreg   - 30 day cardiac monitor    Left atrial flutter   - seen on EP study s/p roof line RFA 7/8/21   - see above    Essential HTN   - BP today controlled, advised her to monitor home BP and bring in log to next appointment since stopping Coreg     Thank you for allowing to us to participate in the care of Marina Nino.    Return in about 2 months (around 12/20/2021) for an appointment with Edith Blanca NP.      ORTEGA Nagel  The [address]  Phone: [phone]  Fax: [phone]    Electronically signed by ORTEGA Alvarez - CNP on 10/20/2021 at 1:42 PM

## 2021-10-22 PROCEDURE — 93228 REMOTE 30 DAY ECG REV/REPORT: CPT | Performed by: NURSE PRACTITIONER

## 2021-11-30 RX ORDER — LISINOPRIL 5 MG/1
5 TABLET ORAL DAILY
Qty: 14 TABLET | Refills: 0 | Status: SHIPPED | OUTPATIENT
Start: 2021-11-30 | End: 2022-02-10

## 2021-11-30 NOTE — TELEPHONE ENCOUNTER
Omid Venecia called in this afternoon stating that she will be out of her Lisinopril before she receives it from [facility].  She asked a 2 week scripted be sent to [facility]    Medication Refill    Medication needing refilled:  lisinopril (PRINIVIL;ZESTRIL)     Dosage of the medication:  5 MG tablet     How are you taking this medication (QD, BID, TID, QID, PRN):  Take 1 tablet by mouth daily    30 or 90 day supply called in: 2 week     When will you run out of your medication:    Which Pharmacy are we sending the medication to?:      [facility]  [address] [facility] [address]   [address] [facility], [address] [place] [id number]   Phone:  [phone]  Fax:  [phone]

## 2021-12-02 DIAGNOSIS — I48.0 PAROXYSMAL ATRIAL FIBRILLATION (HCC): ICD-10-CM

## 2021-12-02 DIAGNOSIS — I48.92 LEFT ATRIAL FLUTTER BY ELECTROCARDIOGRAM (HCC): ICD-10-CM

## 2021-12-20 NOTE — PROGRESS NOTES
[address]   Electrophysiology      Date: 12/22/2021    Primary Cardiologist: Belen Dugan MD  PCP: Lizette Meadows MD     Chief Complaint:   Chief Complaint   Patient presents with    Follow-up     afib - no cardiac complaints     History of Present Illness:    I saw Tommy Sparrow in the office for electrophysiology follow up today. She is a 57 y.o. female with a past medical history of hypertension, hypothyroidism, fibromyalgia, atrial fibrillation and anxiety who presented to [facility] [place] 2/2021 with reports of palpitations and chest pain. She was recently treated for upper respiratory infection. She was noted to be in A fib with RVR and spontaneously converted to [facility] was initiated prior to discharge. She underwent radiofrequency ablation of atrial fibrillation pulmonary vein isolation, roof line for left atrial flutter and CAFE (for A fib) ablation on 7/8/21 with Dr. Magui Rodrigues. Flecainide and Coreg were stopped once she was 3 months out from her ablation and a 30 day monitor was ordered. Monitor showed sinus with atrial runs, the longest being 12 beats. She presents today for monitor results. She has been feeling well since her last visit. She denies any palpitations which she was experiencing almost daily prior to her ablation. Denies any dyspnea or unexplained fatigue. No chest pain. No syncope. No bleeding issues. She has been exercising on her elliptical without any problems. Allergies: Allergies   Allergen Reactions    Cefdinir Other (See Comments)    Food      avacodos    Tylenol With Codeine #3 [Acetaminophen-Codeine] Other (See Comments)     hallicunations    Codeine Other (See Comments)     HALLUCINATIONS  HALLUCINATIONS       Home Medications:  Prior to Visit Medications    Medication Sig Taking?  Authorizing Provider   lisinopril (PRINIVIL;ZESTRIL) 5 MG tablet Take 1 tablet by mouth daily Yes Dinora Menjivar MD   apixaban (ELIQUIS) 5 MG TABS tablet Take 1 tablet by mouth 2 times daily Yes Dominic Hernandez MD   Cholecalciferol (VITAMIN D) 50 MCG (2000 UT) CAPS capsule Take 2,000 Units by mouth daily Yes Historical Provider, MD   Turmeric 1053 MG TABS Take 1 tablet by mouth daily Yes Historical Provider, MD   polyethylene glycol (GLYCOLAX) 17 g packet Take 17 g by mouth nightly Yes Historical Provider, MD   Omega-3 Fatty Acids (FISH OIL CONCENTRATE PO) Take 1 capsule by mouth daily  Yes Historical Provider, MD   Ascorbic Acid (VITAMIN C IMMUNE HEALTH PO) Take 500 mg by mouth daily  Yes Historical Provider, MD   Coenzyme Q10 (COQ10 PO) Take 1 tablet by mouth daily  Yes Historical Provider, MD   levothyroxine (SYNTHROID) 50 MCG tablet Take 50 mcg by mouth Daily Yes Historical Provider, MD   fluticasone (FLONASE) 50 MCG/ACT nasal spray 1 spray by Nasal route daily as needed for Allergies  Yes Historical Provider, MD   cetirizine (ZYRTEC) 10 MG tablet Take 10 mg by mouth daily Yes Historical Provider, MD        Past Medical History:  Past Medical History:   Diagnosis Date    Anxiety     Atrial fibrillation ([facility] [address]     Fibromyalgia     Fibromyalgia     Hypertension     Hypotension     Hypothyroidism     Irregular heart rate        Past Surgical History:   Past Surgical History:   Procedure Laterality Date    CARDIAC CATHETERIZATION  2019    COLONOSCOPY N/A 2/7/2020    COLONOSCOPY WITH BIOPSY performed by Laura Perez MD at [facility]  2/25/2020    COLONOSCOPY POLYPECTOMY SNARE/COLD BIOPSY performed by Laura Perez MD at [address] [facility]  [id number]    adenoidectomy    TUBAL LIGATION      UPPER GASTROINTESTINAL ENDOSCOPY N/A 2/7/2020    EGD BIOPSY performed by Laura Perez MD at [facility] History:   reports that she has never smoked. She has never used smokeless tobacco. She reports current alcohol use. She reports that she does not use drugs.      Family History:      Problem Relation Age of Onset    dry.      Findings: No bruising. Neurological:      General: No focal deficit present. Mental Status: She is alert and oriented to person, place, and time. Motor: No weakness. Psychiatric:         Mood and Affect: Mood normal.         Behavior: Behavior normal.          Pertinent labs, diagnostic, device, and imaging results reviewed as a part of this visit    LABS    CBC:   Lab Results   Component Value Date    WBC 4.3 2021    HGB 13.4 2021    HCT 39.6 2021    MCV 91.5 2021     2021     BMP:   Lab Results   Component Value Date    CREATININE 0.5 (L) 2021    BUN 13 2021     2021    K 3.9 2021    CL 98 (L) 2021    CO2 31 2021     CrCl cannot be calculated (Patient's most recent lab result is older than the maximum 120 days allowed. ). No results found for: BNP    Thyroid: No results found for: TSH, S9RGYWF, G2HGFBF, THYROIDAB  Lipid Panel:   Lab Results   Component Value Date    CHOL 233 2019     2019    TRIG 61 2019     LFTs:  Lab Results   Component Value Date    ALT 16 2021    AST 25 2021    ALKPHOS 45 2021    BILITOT <0.2 2021     Coags:   Lab Results   Component Value Date    PROTIME 17.6 (H) 2021    INR 1.51 (H) 2021       EC2021   SR at 95 BPM. Baseline artifact. Echo: 19   Normal left ventricular size, wall thickness and wall motion with an   estimated ejection fraction of 55-60%. Normal left atrial size. The right ventricle is normal in size and function. There is trivial tricuspid regurgitation with the systolic pulmonary artery   pressure (SPAP) estimated at 31 mmHg    Stress test: 17 @ [facility]  Normal exercise treadmill stress test  Patient's duke score equals 9 which is low risk  No perfusion defect. EF 75%.     Cath: 19 @ [facility]   FINDINGS  1.  Left dominant coronary arterial circulation with no angiographic  evidence of coronary atherosclerosis. 2.  Left ventricular end-diastolic pressure of approximately 12 mmHg. 3.  Normal left ventricular systolic function.  LV ejection fraction of  55%. 4.  No gradient across the aortic valve on pullback to suggest aortic  stenosis. 5.  Significant systemic hypertension. EP Procedures:  1. PVI, roof line (for L flutter), CAFE (for A fib) RFA on 7/8/21, Dr. Dennis Paige:    Paroxysmal atrial fibrillation   - first seen on EKG 2/12/21 but has noted palpitations since 2018   - s/p PVI, CAFE RFA on 7/8/21    - Jason Landa 148 2 (gender, HTN) on Eliquis 5mg BID   - EKG today with SR   - 30 day cardiac monitor showed sinus with atrial runs, longest was 12 beats   - continue to monitor for any symptoms of A fib and if symptomatic, should get an EKG done    Left atrial flutter   - seen on EP study s/p roof line RFA 7/8/21   - see above    Essential HTN   - BP controlled     Thank you for allowing to us to participate in the care of Yanet Rosales.    Return in about 1 year (around 12/22/2022) for an appointment with Hallie Ford NP.      ORTEGA Arellano  The [address] [place] [address]  Phone: [phone]  Fax: [phone]    Electronically signed by ORTEGA Matos - CNP on 12/22/2021 at 3:28 PM

## 2021-12-22 ENCOUNTER — OFFICE VISIT (OUTPATIENT)
Dept: CARDIOLOGY CLINIC | Age: 61
End: 2021-12-22
Payer: COMMERCIAL

## 2021-12-22 VITALS
BODY MASS INDEX: 23.19 KG/M2 | SYSTOLIC BLOOD PRESSURE: 128 MMHG | DIASTOLIC BLOOD PRESSURE: 84 MMHG | HEIGHT: 68 IN | WEIGHT: 153 LBS | HEART RATE: 94 BPM | OXYGEN SATURATION: 99 %

## 2021-12-22 DIAGNOSIS — I10 ESSENTIAL HYPERTENSION: ICD-10-CM

## 2021-12-22 DIAGNOSIS — I48.92 LEFT ATRIAL FLUTTER BY ELECTROCARDIOGRAM (HCC): ICD-10-CM

## 2021-12-22 DIAGNOSIS — I48.0 PAROXYSMAL ATRIAL FIBRILLATION (HCC): Primary | ICD-10-CM

## 2021-12-22 PROBLEM — I25.9 CHEST PAIN DUE TO MYOCARDIAL ISCHEMIA: Status: RESOLVED | Noted: 2017-08-28 | Resolved: 2021-12-22

## 2021-12-22 PROCEDURE — 93000 ELECTROCARDIOGRAM COMPLETE: CPT | Performed by: NURSE PRACTITIONER

## 2021-12-22 PROCEDURE — 99214 OFFICE O/P EST MOD 30 MIN: CPT | Performed by: NURSE PRACTITIONER

## 2021-12-22 ASSESSMENT — ENCOUNTER SYMPTOMS
WHEEZING: 0
COLOR CHANGE: 0
SHORTNESS OF BREATH: 0
NAUSEA: 0
BACK PAIN: 0
DIARRHEA: 0
COUGH: 0
ABDOMINAL PAIN: 0
TROUBLE SWALLOWING: 0
SINUS PRESSURE: 0
CONSTIPATION: 0
BLOOD IN STOOL: 0
SORE THROAT: 0
VOMITING: 0

## 2022-01-11 RX ORDER — APIXABAN 5 MG/1
TABLET, FILM COATED ORAL
Qty: 180 TABLET | Refills: 3 | Status: SHIPPED | OUTPATIENT
Start: 2022-01-11

## 2022-02-10 RX ORDER — LISINOPRIL 5 MG/1
TABLET ORAL
Qty: 90 TABLET | Refills: 3 | Status: SHIPPED | OUTPATIENT
Start: 2022-02-10

## 2022-02-10 RX ORDER — LISINOPRIL 5 MG/1
5 TABLET ORAL DAILY
Qty: 90 TABLET | Refills: 3 | Status: SHIPPED | OUTPATIENT
Start: 2022-02-10

## 2022-07-27 ENCOUNTER — HOSPITAL ENCOUNTER (EMERGENCY)
Age: 62
Discharge: HOME | End: 2022-07-27
Payer: COMMERCIAL

## 2022-07-27 ENCOUNTER — APPOINTMENT (OUTPATIENT)
Dept: GENERAL RADIOLOGY | Age: 62
End: 2022-07-27
Payer: COMMERCIAL

## 2022-07-27 VITALS
TEMPERATURE: 98 F | OXYGEN SATURATION: 98 % | SYSTOLIC BLOOD PRESSURE: 186 MMHG | RESPIRATION RATE: 20 BRPM | DIASTOLIC BLOOD PRESSURE: 119 MMHG | HEART RATE: 95 BPM

## 2022-07-27 DIAGNOSIS — M25.462 KNEE EFFUSION, LEFT: ICD-10-CM

## 2022-07-27 DIAGNOSIS — S89.92XA INJURY OF LEFT KNEE, INITIAL ENCOUNTER: Primary | ICD-10-CM

## 2022-07-27 PROCEDURE — 73562 X-RAY EXAM OF KNEE 3: CPT

## 2022-07-27 PROCEDURE — 6360000002 HC RX W HCPCS: Performed by: PHYSICIAN ASSISTANT

## 2022-07-27 PROCEDURE — 99283 EMERGENCY DEPT VISIT LOW MDM: CPT

## 2022-07-27 RX ORDER — METHYLPREDNISOLONE 4 MG/1
TABLET ORAL
Qty: 21 TABLET | Refills: 0 | Status: SHIPPED | OUTPATIENT
Start: 2022-07-27

## 2022-07-27 RX ORDER — DEXAMETHASONE 4 MG/1
4 TABLET ORAL ONCE
Status: COMPLETED | OUTPATIENT
Start: 2022-07-27 | End: 2022-07-27

## 2022-07-27 RX ADMIN — DEXAMETHASONE 4 MG: 4 TABLET ORAL at 17:57

## 2022-07-27 ASSESSMENT — PAIN DESCRIPTION - ORIENTATION
ORIENTATION: LEFT
ORIENTATION: LEFT

## 2022-07-27 ASSESSMENT — PAIN - FUNCTIONAL ASSESSMENT
PAIN_FUNCTIONAL_ASSESSMENT: 0-10
PAIN_FUNCTIONAL_ASSESSMENT: 0-10

## 2022-07-27 ASSESSMENT — PAIN DESCRIPTION - DESCRIPTORS: DESCRIPTORS: THROBBING;ACHING

## 2022-07-27 ASSESSMENT — PAIN SCALES - GENERAL
PAINLEVEL_OUTOF10: 6
PAINLEVEL_OUTOF10: 6

## 2022-07-27 ASSESSMENT — PAIN DESCRIPTION - LOCATION
LOCATION: KNEE
LOCATION: KNEE

## 2022-07-27 ASSESSMENT — PAIN DESCRIPTION - FREQUENCY
FREQUENCY: CONTINUOUS
FREQUENCY: CONTINUOUS

## 2022-07-27 ASSESSMENT — PAIN DESCRIPTION - PAIN TYPE
TYPE: ACUTE PAIN
TYPE: ACUTE PAIN

## 2022-07-27 NOTE — ED PROVIDER NOTES
**ADVANCED PRACTICE PROVIDER, I HAVE EVALUATED THIS PATIENT**        [address]      Pt Name: Umm Fair  VS:[id number]  Juan Diegogfjohnny 1960  Date of evaluation: 7/27/2022  Provider: Isaías Ramos PA-C      Chief Complaint:    Chief Complaint   Patient presents with    Knee Pain     Pt states fell on left knee yesterday afternoon concrete floor . Not able to bear weight. Nursing Notes, Past Medical Hx, Past Surgical Hx, Social Hx, Allergies, and Family Hx were all reviewed and agreed with or any disagreements were addressed in the HPI.    HPI: (Location, Duration, Timing, Severity, Quality, Assoc Sx, Context, Modifying factors)    Chief Complaint of left knee pain swelling and tenderness after an accidental fall yesterday. This is a  60 y.o. female who presents stating that when she had the fall yesterday she hit both knees on the concrete floor. She was able to get up and did have a low bit of local swelling there but was able to walk okay. However overnight she got a lot of swelling in the left knee and now cannot bear to walk with it because it is too stiff and hurts too bad to straighten and bend again. Pain this is local constant nonradiating. She is concerned about possible fracture and came to the ER. She has taken some Tylenol as well as some turmeric to try to help with her discomfort so far.     PastMedical/Surgical History:      Diagnosis Date    Anxiety     Atrial fibrillation (HCC)     Fibromyalgia     Fibromyalgia     Hypertension     Hypotension     Hypothyroidism     Irregular heart rate          Procedure Laterality Date    CARDIAC CATHETERIZATION  2019    COLONOSCOPY N/A 2/7/2020    COLONOSCOPY WITH BIOPSY performed by Jimena Taylor MD at [place] [address]  2/25/2020    COLONOSCOPY POLYPECTOMY SNARE/COLD BIOPSY performed by Jimena Taylor MD at [address] [place] [address] TONSILLECTOMY  [id number]    adenoidectomy    TUBAL LIGATION      UPPER GASTROINTESTINAL ENDOSCOPY N/A 2/7/2020    EGD BIOPSY performed by Rosibel Gaffney MD at [facility] ENDOSCOPY       Medications:  Previous Medications    ASCORBIC ACID (VITAMIN C IMMUNE HEALTH PO)    Take 500 mg by mouth daily     CETIRIZINE (ZYRTEC) 10 MG TABLET    Take 10 mg by mouth daily    CHOLECALCIFEROL (VITAMIN D) 50 MCG (2000 UT) CAPS CAPSULE    Take 2,000 Units by mouth daily    COENZYME Q10 (COQ10 PO)    Take 1 tablet by mouth daily     ELIQUIS 5 MG TABS TABLET    TAKE 1 TABLET TWICE A DAY    FLUTICASONE (FLONASE) 50 MCG/ACT NASAL SPRAY    1 spray by Nasal route daily as needed for Allergies     LEVOTHYROXINE (SYNTHROID) 50 MCG TABLET    Take 50 mcg by mouth Daily    LISINOPRIL (PRINIVIL;ZESTRIL) 5 MG TABLET    Take 1 tablet by mouth daily    LISINOPRIL (PRINIVIL;ZESTRIL) 5 MG TABLET    TAKE 1 TABLET DAILY    OMEGA-3 FATTY ACIDS (FISH OIL CONCENTRATE PO)    Take 1 capsule by mouth daily     POLYETHYLENE GLYCOL (GLYCOLAX) 17 G PACKET    Take 17 g by mouth nightly    TURMERIC 1053 MG TABS    Take 1 tablet by mouth daily         Review of Systems:  (2-9 systems needed)  Review of Systems   Constitutional:  Negative for chills and fever. Positive history as above. No difficulty eating or drinking   HENT:  Negative for congestion. Eyes:         No vision change. Respiratory:  Negative for cough and shortness of breath. No productive cough   Cardiovascular:  Negative for chest pain and palpitations. Gastrointestinal:  Negative for blood in stool and constipation. No difficulty eating or drinking. Genitourinary:  Negative for difficulty urinating, dysuria and urgency. Musculoskeletal:  Positive for arthralgias and joint swelling. Negative for neck pain and neck stiffness. No acute fall, contusion, or twisting injury or known overuse injury.   No symptom of right or left, upper or lower extremity acute swelling, discoloration, heat change, cramping, deformity, or acute loss of sensation, range of motion or strength or coordination   Skin:  Negative for color change, rash and wound. No heat change   Neurological:  Negative for dizziness, syncope and headaches. Psychiatric/Behavioral:  Negative for confusion. \"Positives and Pertinent negatives as per HPI\"    Physical Exam:  Physical Exam  Vitals and nursing note reviewed. Constitutional:       Appearance: Normal appearance. She is not diaphoretic. HENT:      Head: Normocephalic and atraumatic. Right Ear: External ear normal.      Left Ear: External ear normal.      Nose: Nose normal.   Eyes:      General:         Right eye: No discharge. Left eye: No discharge. Conjunctiva/sclera: Conjunctivae normal.   Pulmonary:      Effort: Pulmonary effort is normal. No respiratory distress. Musculoskeletal:         General: Swelling, tenderness and signs of injury present. No deformity. Cervical back: Normal range of motion and neck supple. Right lower leg: No edema. Left lower leg: No edema. Comments: Mild abrasion of the right knee. However left knee with effusion noted superiorly and posteriorly on exam.  No bony tenderness or joint laxity based on exam.  Very minimal increased warmth in that left knee compared to the right. Patient is able to keep the area relatively straight but hurts worse with trying to bend it. Distal pulses 2+ bilaterally and dorsalis pedis. Skin:     General: Skin is warm and dry. Capillary Refill: Capillary refill takes less than 2 seconds. Neurological:      Mental Status: She is alert and oriented to person, place, and time. Mental status is at baseline.    Psychiatric:         Mood and Affect: Mood normal.         Behavior: Behavior normal.       MEDICAL DECISION MAKING    Vitals:    Vitals:    07/27/22 1534   BP: (!) 186/119   Pulse: 95   Resp: 20   Temp: 98 °F (36.7 °C)   TempSrc: Oral SpO2: 98%       LABS:Labs Reviewed - No data to display     Remainder of labs reviewed and were negative at this time or not returned at the time of this note. RADIOLOGY:   Non-plain film images such as CT, Ultrasound and MRI are read by the radiologist. Nadeen Middleton PA-C have directly visualized the radiologic plain film image(s) with the below findings:      Interpretation per the Radiologist below, if available at the time of this note:    XR KNEE LEFT (3 VIEWS)   Final Result   No acute osseous abnormality of the left knee. Joint effusion. XR KNEE LEFT (3 VIEWS)    Result Date: 7/27/2022  EXAMINATION: THREE XRAY VIEWS OF THE LEFT KNEE 7/27/2022 3:41 pm COMPARISON: None. HISTORY: ORDERING SYSTEM PROVIDED HISTORY: left knee injury from fall. unable to bear weight TECHNOLOGIST PROVIDED HISTORY: Reason for exam:->left knee injury from fall. unable to bear weight Reason for Exam: fall FINDINGS: No acute fracture or dislocation. Joint spaces are preserved with no significant arthritic changes. A suprapatellar joint effusion is present. Soft tissues are otherwise unremarkable. No acute osseous abnormality of the left knee. Joint effusion. MEDICAL DECISION MAKING / ED COURSE:      PROCEDURES:   Procedures    None    Patient was given:  Medications   dexamethasone (DECADRON) tablet 4 mg (has no administration in time range)     Patient presents as above and x-rays obtained. These return as above. Exam findings also as above. At this time patient does have knee effusion in the left knee with the possibility of meniscal injury not ruled out at this time. However no acute joint laxity or indication of likely infectious etiology or vascular insufficiency or bony injury among other severe issues. Knee immobilizer and crutches given here. Also initial steroid given. Patient agreeable with the above and the following discharge home instructions.   Home stable condition to ice the area, use the knee immobilizer and crutches for at least the first couple of days, use the medication as written and Tylenol. Slowly advance activity as tolerated. Monitor for improvement. Call orthopedics outpatient for further care and treatment if needed. Return to the emergency department for any emergent worsening or concern    The patient tolerated their visit well. I evaluated the patient. The physician was available for consultation as needed. The patient and / or the family were informed of the results of any tests, a time was given to answer questions, a plan was proposed and they agreed with plan. CLINICAL IMPRESSION:  1. Injury of left knee, initial encounter    2. Knee effusion, left        DISPOSITION Decision To Discharge 07/27/2022 05:23:42 PM      PATIENT REFERRED TO:  No follow-up provider specified. DISCHARGE MEDICATIONS:  New Prescriptions    METHYLPREDNISOLONE (MEDROL, KAMERON,) 4 MG TABLET    Take 6 tabs by mouth day 1, 5 tabs day 2, 4 tabs day 3, 3 tabs day 4, 2 tabs day 5, and 1 tab day 6.        DISCONTINUED MEDICATIONS:  Discontinued Medications    No medications on file              (Please note the MDM and HPI sections of this note were completed with a voice recognition program.  Efforts were made to edit the dictations but occasionally words are mis-transcribed.)    Electronically signed, Thea Mccrary PA-C,          Thea Mccrary PA-C  07/28/22 [id number]

## 2022-07-27 NOTE — DISCHARGE INSTRUCTIONS
Home stable condition to ice the area, use the knee immobilizer and crutches for at least the first couple of days, use the medication as written and Tylenol. Slowly advance activity as tolerated. Monitor for improvement. Call orthopedics outpatient for further care and treatment if needed.   Return to the emergency department for any emergent worsening or concern

## 2022-07-28 ASSESSMENT — ENCOUNTER SYMPTOMS
COUGH: 0
SHORTNESS OF BREATH: 0
BLOOD IN STOOL: 0
CONSTIPATION: 0
ROS SKIN COMMENTS: NO HEAT CHANGE
COLOR CHANGE: 0

## 2023-01-06 RX ORDER — APIXABAN 5 MG/1
TABLET, FILM COATED ORAL
Qty: 180 TABLET | Refills: 1 | Status: SHIPPED | OUTPATIENT
Start: 2023-01-06

## 2023-01-13 NOTE — PROGRESS NOTES
[address]   Electrophysiology      Date: 1/16/2023    Primary Cardiologist: Shira Cobian MD  PCP: Javier Doe MD     Chief Complaint:   Chief Complaint   Patient presents with    Follow-up     History of Present Illness:    I saw Arnold Trinh in the office for electrophysiology follow up today. She is a 60 y.o. female with a past medical history of hypertension, hypothyroidism, fibromyalgia, atrial fibrillation and anxiety who presented [facility] 2[facility] with reports of palpitations and chest pain. She was recently treated for upper respiratory infection. She was noted to be in A fib with RVR and spontaneously converted to SR. [address] was initiated prior to discharge. She underwent radiofrequency ablation of atrial fibrillation pulmonary vein isolation, roof line for left atrial flutter and CAFE (for A fib) ablation on 7/8/21 with Dr. Babatunde Jones. She presents today for yearly follow up. She has been feeling pretty well since her last visit. She has noticed her BP being higher than before. It typically runs 140s/80s. Denies any palpitations or dyspnea. No chest pain. No syncope. No edema. She stays active with working out and her [address] with no limitations. Allergies: Allergies   Allergen Reactions    Cefdinir Other (See Comments)    Food      avacodos    Tylenol With Codeine #3 [Acetaminophen-Codeine] Other (See Comments)     hallicunations    Codeine Other (See Comments)     HALLUCINATIONS  HALLUCINATIONS       Home Medications:  Prior to Visit Medications    Medication Sig Taking?  Authorizing Provider   hydroCHLOROthiazide (HYDRODIURIL) 25 MG tablet Take 1 tablet by mouth every morning Yes Fariba Matt APRN - CNP   ELIQUIS 5 MG TABS tablet TAKE 1 TABLET TWICE A DAY Yes Gillian Scherer MD   Cholecalciferol (VITAMIN D) 50 MCG (2000 UT) CAPS capsule Take 2,000 Units by mouth daily Yes Historical Provider, MD   Turmeric 1053 MG TABS Take 1 tablet by mouth daily Yes Historical Provider, MD   polyethylene glycol (GLYCOLAX) 17 g packet Take 17 g by mouth nightly Yes Historical Provider, MD   Omega-3 Fatty Acids (FISH OIL CONCENTRATE PO) Take 1 capsule by mouth daily  Yes Historical Provider, MD   Ascorbic Acid (VITAMIN C IMMUNE HEALTH PO) Take 500 mg by mouth daily  Yes Historical Provider, MD   Coenzyme Q10 (COQ10 PO) Take 1 tablet by mouth daily  Yes Historical Provider, MD   levothyroxine (SYNTHROID) 50 MCG tablet Take 50 mcg by mouth Daily Yes Historical Provider, MD   cetirizine (ZYRTEC) 10 MG tablet Take 10 mg by mouth daily Yes Historical Provider, MD        Past Medical History:  Past Medical History:   Diagnosis Date    Anxiety     Atrial fibrillation (HCC)     Fibromyalgia     Fibromyalgia     Hypertension     Hypotension     Hypothyroidism     Irregular heart rate        Past Surgical History:   Past Surgical History:   Procedure Laterality Date    CARDIAC CATHETERIZATION  2019    COLONOSCOPY N/A 2/7/2020    COLONOSCOPY WITH BIOPSY performed by Atilio Gilliland MD at [facility]  2/25/2020    COLONOSCOPY POLYPECTOMY SNARE/COLD BIOPSY performed by Atilio Gilliland MD at [address] [place] [address]  [id number]    adenoidectomy    TUBAL LIGATION      UPPER GASTROINTESTINAL ENDOSCOPY N/A 2/7/2020    EGD BIOPSY performed by Atilio Gilliland MD at [address] [facility] History:   reports that she has never smoked. She has never used smokeless tobacco. She reports current alcohol use. She reports that she does not use drugs. Family History:      Problem Relation Age of Onset    High Blood Pressure Mother     Dementia Mother        Review of Systems   Constitutional:  Negative for chills, fatigue, fever and unexpected weight change. HENT:  Negative for congestion, hearing loss, sinus pressure, sore throat and trouble swallowing. Respiratory:  Negative for cough, shortness of breath and wheezing.     Cardiovascular:  Negative for chest pain, palpitations and leg swelling. Gastrointestinal:  Negative for abdominal pain, blood in stool, constipation, diarrhea, nausea and vomiting. Genitourinary:  Negative for hematuria. Musculoskeletal:  Negative for arthralgias, back pain, gait problem and myalgias. Skin:  Negative for color change, rash and wound. Neurological:  Negative for dizziness, seizures, syncope, speech difficulty, weakness and light-headedness. Hematological:  Does not bruise/bleed easily. Physical Examination:  Vitals:    01/16/23 1600   BP: (!) 142/84   Pulse:    SpO2:       Wt Readings from Last 3 Encounters:   01/16/23 159 lb (72.1 kg)   12/22/21 153 lb (69.4 kg)   10/20/21 152 lb (68.9 kg)       Physical Exam  Vitals reviewed. Constitutional:       General: She is not in acute distress. Appearance: Normal appearance. HENT:      Head: Normocephalic and atraumatic. Nose: Nose normal.      Mouth/Throat:      Mouth: Mucous membranes are moist.   Eyes:      Conjunctiva/sclera: Conjunctivae normal.      Pupils: Pupils are equal, round, and reactive to light. Cardiovascular:      Rate and Rhythm: Normal rate and regular rhythm. Heart sounds: No murmur heard. No friction rub. No gallop. Pulmonary:      Effort: No respiratory distress. Breath sounds: No wheezing, rhonchi or rales. Abdominal:      General: Abdomen is flat. Bowel sounds are normal.      Palpations: Abdomen is soft. Musculoskeletal:         General: Normal range of motion. Right lower leg: No edema. Left lower leg: No edema. Skin:     General: Skin is warm and dry. Findings: No bruising. Neurological:      General: No focal deficit present. Mental Status: She is alert and oriented to person, place, and time. Motor: No weakness.    Psychiatric:         Mood and Affect: Mood normal.         Behavior: Behavior normal.        Pertinent labs, diagnostic, device, and imaging results reviewed as a part of this visit    LABS    CBC:   Lab Results   Component Value Date    WBC 4.3 2021    HGB 13.4 2021    HCT 39.6 2021    MCV 91.5 2021     2021     BMP:   Lab Results   Component Value Date    CREATININE 0.5 (L) 2021    BUN 13 2021     2021    K 3.9 2021    CL 98 (L) 2021    CO2 31 2021     CrCl cannot be calculated (Patient's most recent lab result is older than the maximum 180 days allowed. ). No results found for: BNP    Thyroid: No results found for: TSH, P5YBHYS, S6WYIJZ, THYROIDAB  Lipid Panel:   Lab Results   Component Value Date/Time    CHOL 233 2019 05:16 AM     2019 05:16 AM    TRIG 61 2019 05:16 AM     LFTs:  Lab Results   Component Value Date    ALT 16 2021    AST 25 2021    ALKPHOS 45 2021    BILITOT <0.2 2021     Coags:   Lab Results   Component Value Date    PROTIME 17.6 (H) 2021    INR 1.51 (H) 2021       EC2023   SR at 80 BPM.     Echo: 19   Normal left ventricular size, wall thickness and wall motion with an   estimated ejection fraction of 55-60%. Normal left atrial size. The right ventricle is normal in size and function. There is trivial tricuspid regurgitation with the systolic pulmonary artery   pressure (SPAP) estimated at 31 mmHg    Stress test: 17 @ [facility]  Normal exercise treadmill stress test  Patient's duke score equals 9 which is low risk  No perfusion defect. EF 75%. Cath: 19 @ [facility]   FINDINGS  1. Left dominant coronary arterial circulation with no angiographic  evidence of coronary atherosclerosis. 2.  Left ventricular end-diastolic pressure of approximately 12 mmHg. 3.  Normal left ventricular systolic function. LV ejection fraction of  55%. 4.  No gradient across the aortic valve on pullback to suggest aortic  stenosis. 5.  Significant systemic hypertension. EP Procedures:  1.  PVI, roof line (for L flutter), CAFE (for A fib) RFA on 7/8/21, Dr. Sara Woody:    Paroxysmal atrial fibrillation   - first seen on EKG 2/12/21 but has noted palpitations since 2018   - s/p PVI, CAFE RFA on 7/8/21    - Jason Landa 148 2 (gender, HTN) on Eliquis 5mg BID   - EKG today with SR   - 30 day cardiac monitor 10/21 showed sinus with atrial runs, longest was 12 beats   - continue to monitor for any symptoms of A fib and if symptomatic, should get an EKG done    Left atrial flutter   - seen on EP study s/p roof line RFA 7/8/21   - see above    Essential HTN   - BP a little above goal (130/80), she thinks the lisinopril is not working for her   - discussed increase lisinopril or trying different medications, she wants to try a diuretic so will change to hydrochlorothiazide 25mg QD   - advised her to call in home blood pressures in about 3 weeks     Thank you for allowing to us to participate in the care of Kaylan Lynch.    Return in about 1 year (around 1/16/2024) for an appointment with Kimberli Buck NP.      ORTEGA Haley  [facility], [address] [place] [address]  Phone: [phone]  Fax: [phone]    Electronically signed by Darylene Basta, APRN - CNP on 1/16/2023 at 4:03 PM

## 2023-01-16 ENCOUNTER — OFFICE VISIT (OUTPATIENT)
Dept: CARDIOLOGY CLINIC | Age: 63
End: 2023-01-16
Payer: COMMERCIAL

## 2023-01-16 VITALS
HEIGHT: 68 IN | WEIGHT: 159 LBS | DIASTOLIC BLOOD PRESSURE: 84 MMHG | SYSTOLIC BLOOD PRESSURE: 142 MMHG | HEART RATE: 83 BPM | OXYGEN SATURATION: 97 % | BODY MASS INDEX: 24.1 KG/M2

## 2023-01-16 DIAGNOSIS — I10 ESSENTIAL HYPERTENSION: ICD-10-CM

## 2023-01-16 DIAGNOSIS — I48.0 PAROXYSMAL ATRIAL FIBRILLATION (HCC): Primary | ICD-10-CM

## 2023-01-16 DIAGNOSIS — I48.92 LEFT ATRIAL FLUTTER BY ELECTROCARDIOGRAM (HCC): ICD-10-CM

## 2023-01-16 PROCEDURE — 3079F DIAST BP 80-89 MM HG: CPT | Performed by: NURSE PRACTITIONER

## 2023-01-16 PROCEDURE — 93000 ELECTROCARDIOGRAM COMPLETE: CPT | Performed by: NURSE PRACTITIONER

## 2023-01-16 PROCEDURE — 3077F SYST BP >= 140 MM HG: CPT | Performed by: NURSE PRACTITIONER

## 2023-01-16 PROCEDURE — 99214 OFFICE O/P EST MOD 30 MIN: CPT | Performed by: NURSE PRACTITIONER

## 2023-01-16 RX ORDER — HYDROCHLOROTHIAZIDE 25 MG/1
25 TABLET ORAL EVERY MORNING
Qty: 90 TABLET | Refills: 1 | Status: SHIPPED | OUTPATIENT
Start: 2023-01-16

## 2023-01-16 ASSESSMENT — ENCOUNTER SYMPTOMS
TROUBLE SWALLOWING: 0
SINUS PRESSURE: 0
VOMITING: 0
COLOR CHANGE: 0
NAUSEA: 0
WHEEZING: 0
DIARRHEA: 0
SHORTNESS OF BREATH: 0
BLOOD IN STOOL: 0
ABDOMINAL PAIN: 0
CONSTIPATION: 0
SORE THROAT: 0
BACK PAIN: 0
COUGH: 0

## 2023-01-17 ENCOUNTER — HOSPITAL ENCOUNTER (OUTPATIENT)
Dept: WOMENS IMAGING | Age: 63
Discharge: HOME | End: 2023-01-17
Payer: COMMERCIAL

## 2023-01-17 DIAGNOSIS — Z12.31 BREAST CANCER SCREENING BY MAMMOGRAM: ICD-10-CM

## 2023-01-17 PROCEDURE — 77063 BREAST TOMOSYNTHESIS BI: CPT

## 2023-02-01 ENCOUNTER — TELEPHONE (OUTPATIENT)
Dept: CARDIOLOGY CLINIC | Age: 63
End: 2023-02-01

## 2023-02-21 NOTE — TELEPHONE ENCOUNTER
Pt called said she still has not received her ELIQUIS. She also called her ins company they said they have not received anything from us. Advised PA was faxed 2/9. Would like someone to all her.     Stephanie # [phone]

## 2023-02-24 NOTE — TELEPHONE ENCOUNTER
Called PT and LM stating that the PA for her Eliquis is already on file with her insurance company and doesn't  until 24. Explained that medication should be available at her pharmacy. Advised PT to return call to the office if she has any questions.

## 2023-03-01 DIAGNOSIS — I10 ESSENTIAL HYPERTENSION: ICD-10-CM

## 2023-03-01 DIAGNOSIS — I48.0 PAROXYSMAL ATRIAL FIBRILLATION (HCC): Primary | ICD-10-CM

## 2023-03-01 DIAGNOSIS — M79.89 RIGHT LEG SWELLING: ICD-10-CM

## 2023-03-01 NOTE — TELEPHONE ENCOUNTER
Medication Refill    Medication needing refilled:  hydroCHLOROthiazide (HYDRODIURIL)    Dosage of the medication:  25 MG tablet    How are you taking this medication (QD, BID, TID, QID, PRN):  Take 1 tablet by mouth every morning    30 or 90 day supply called in: 90 day supply    When will you run out of your medication:   [facility]  [address], [address] [place] [address] [address] [facility] [phone]

## 2023-03-02 RX ORDER — HYDROCHLOROTHIAZIDE 25 MG/1
25 TABLET ORAL EVERY MORNING
Qty: 90 TABLET | Refills: 1 | Status: SHIPPED | OUTPATIENT
Start: 2023-03-02

## 2023-03-02 NOTE — TELEPHONE ENCOUNTER
Last OV:  1/16/23  Next OV:  X  Last refill: 1/16/23  #90  1 R/F  Most recent Labs:  7/2/21  Last EKG (if needed): 1/16/23

## 2023-04-06 RX ORDER — LISINOPRIL 5 MG/1
TABLET ORAL
Qty: 90 TABLET | Refills: 3 | OUTPATIENT
Start: 2023-04-06

## 2023-07-11 ENCOUNTER — TELEPHONE (OUTPATIENT)
Dept: CARDIOLOGY CLINIC | Age: 63
End: 2023-07-11

## 2023-07-11 NOTE — TELEPHONE ENCOUNTER
She stated that can only one refill be sent to [facility] because she 's leaving to go out of town on Thursday. The rest of the refill be sent to [address] [place] [address].   Medication Refill    Medication needing refilled:CASSANDRALQUMATILDE    Dosage of the medication:5MG    How are you taking this medication (QD, BID, TID, QID, PRN):1 TABLET TWICE DAILY    30 or 90 day supply called in:90DAYS    When will you run out of your medication:2 TABLETS LEFT    Which Pharmacy are we sending the medication to?:[id number], [address] [place] [address], [address] [place] [id number]   Phone:  [phone]  Fax:  [phone]

## 2023-08-17 DIAGNOSIS — I10 ESSENTIAL HYPERTENSION: ICD-10-CM

## 2023-08-17 DIAGNOSIS — I48.0 PAROXYSMAL ATRIAL FIBRILLATION (HCC): ICD-10-CM

## 2023-08-17 DIAGNOSIS — M79.89 RIGHT LEG SWELLING: ICD-10-CM

## 2023-08-17 RX ORDER — HYDROCHLOROTHIAZIDE 25 MG/1
TABLET ORAL
Qty: 90 TABLET | Refills: 3 | Status: SHIPPED | OUTPATIENT
Start: 2023-08-17

## 2023-08-17 NOTE — TELEPHONE ENCOUNTER
Medication:   Requested Prescriptions     Pending Prescriptions Disp Refills    hydroCHLOROthiazide (HYDRODIURIL) 25 MG tablet [Pharmacy Med Name: HYDROCHLOROTHIAZIDE TABS 25MG] 90 tablet 3     Sig: TAKE 1 TABLET EVERY MORNING       Last Filled:  2023    Patient Phone Number: [phone] (home)     Last appt: 2023   Next appt: Visit date not found  Last EK2023    Last Lipid:   Lab Results   Component Value Date/Time    CHOL 233 2019 05:16 AM    TRIG 61 2019 05:16 AM     2019 05:16 AM    LDLCALC 111 2019 05:16 AM

## 2023-09-01 ENCOUNTER — APPOINTMENT (OUTPATIENT)
Dept: GENERAL RADIOLOGY | Age: 63
End: 2023-09-01
Payer: COMMERCIAL

## 2023-09-01 ENCOUNTER — APPOINTMENT (OUTPATIENT)
Dept: CT IMAGING | Age: 63
End: 2023-09-01
Payer: COMMERCIAL

## 2023-09-01 ENCOUNTER — HOSPITAL ENCOUNTER (EMERGENCY)
Age: 63
Discharge: HOME | End: 2023-09-02
Payer: COMMERCIAL

## 2023-09-01 VITALS
HEART RATE: 79 BPM | TEMPERATURE: 97.2 F | DIASTOLIC BLOOD PRESSURE: 94 MMHG | RESPIRATION RATE: 18 BRPM | BODY MASS INDEX: 26.37 KG/M2 | OXYGEN SATURATION: 98 % | SYSTOLIC BLOOD PRESSURE: 151 MMHG | HEIGHT: 67 IN | WEIGHT: 167.99 LBS

## 2023-09-01 DIAGNOSIS — S00.83XA CONTUSION OF FACE, INITIAL ENCOUNTER: ICD-10-CM

## 2023-09-01 DIAGNOSIS — Z79.01 ON ANTICOAGULANT THERAPY: ICD-10-CM

## 2023-09-01 DIAGNOSIS — S42.341A CLOSED DISPLACED SPIRAL FRACTURE OF SHAFT OF RIGHT HUMERUS, INITIAL ENCOUNTER: Primary | ICD-10-CM
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
## 2023-09-01 PROCEDURE — 73030 X-RAY EXAM OF SHOULDER: CPT

## 2023-09-01 PROCEDURE — 70486 CT MAXILLOFACIAL W/O DYE: CPT

## 2023-09-01 PROCEDURE — 70450 CT HEAD/BRAIN W/O DYE: CPT

## 2023-09-01 PROCEDURE — 6370000000 HC RX 637 (ALT 250 FOR IP): Performed by: PHYSICIAN ASSISTANT

## 2023-09-01 PROCEDURE — 73060 X-RAY EXAM OF HUMERUS: CPT

## 2023-09-01 PROCEDURE — 99284 EMERGENCY DEPT VISIT MOD MDM: CPT

## 2023-09-01 PROCEDURE — 72125 CT NECK SPINE W/O DYE: CPT

## 2023-09-01 RX ORDER — ONDANSETRON 4 MG/1
4 TABLET, ORALLY DISINTEGRATING ORAL ONCE
Status: COMPLETED | OUTPATIENT
Start: 2023-09-02 | End: 2023-09-02

## 2023-09-01 RX ORDER — OXYCODONE HYDROCHLORIDE AND ACETAMINOPHEN 5; 325 MG/1; MG/1
1 TABLET ORAL
Qty: 20 TABLET | Refills: 0 | Status: SHIPPED | OUTPATIENT
Start: 2023-09-01 | End: 2023-09-02 | Stop reason: SDUPTHER

## 2023-09-01 RX ORDER — ACETAMINOPHEN 500 MG
500 TABLET ORAL ONCE
Status: COMPLETED | OUTPATIENT
Start: 2023-09-01 | End: 2023-09-01

## 2023-09-01 RX ORDER — ONDANSETRON 4 MG/1
4 TABLET, ORALLY DISINTEGRATING ORAL EVERY 8 HOURS PRN
Qty: 15 TABLET | Refills: 0 | Status: SHIPPED | OUTPATIENT
Start: 2023-09-01 | End: 2023-09-02 | Stop reason: SDUPTHER

## 2023-09-01 RX ORDER — OXYCODONE HYDROCHLORIDE AND ACETAMINOPHEN 5; 325 MG/1; MG/1
1 TABLET ORAL ONCE
Status: COMPLETED | OUTPATIENT
Start: 2023-09-01 | End: 2023-09-01

## 2023-09-01 RX ADMIN — OXYCODONE AND ACETAMINOPHEN 1 TABLET: 5; 325 TABLET ORAL at 21:58

## 2023-09-01 RX ADMIN — ACETAMINOPHEN 500 MG: 500 TABLET ORAL at 21:57

## 2023-09-01 ASSESSMENT — PAIN DESCRIPTION - LOCATION: LOCATION: SHOULDER

## 2023-09-01 ASSESSMENT — PAIN - FUNCTIONAL ASSESSMENT
PAIN_FUNCTIONAL_ASSESSMENT: 0-10
PAIN_FUNCTIONAL_ASSESSMENT: PREVENTS OR INTERFERES SOME ACTIVE ACTIVITIES AND ADLS

## 2023-09-01 ASSESSMENT — PAIN SCALES - GENERAL
PAINLEVEL_OUTOF10: 10
PAINLEVEL_OUTOF10: 10

## 2023-09-01 ASSESSMENT — PAIN DESCRIPTION - ORIENTATION: ORIENTATION: RIGHT

## 2023-09-02 PROCEDURE — 6370000000 HC RX 637 (ALT 250 FOR IP): Performed by: PHYSICIAN ASSISTANT

## 2023-09-02 RX ORDER — OXYCODONE HYDROCHLORIDE AND ACETAMINOPHEN 5; 325 MG/1; MG/1
1 TABLET ORAL
Qty: 20 TABLET | Refills: 0 | Status: SHIPPED | OUTPATIENT
Start: 2023-09-02 | End: 2023-09-07

## 2023-09-02 RX ORDER — ONDANSETRON 4 MG/1
4 TABLET, ORALLY DISINTEGRATING ORAL EVERY 8 HOURS PRN
Qty: 15 TABLET | Refills: 0 | Status: SHIPPED | OUTPATIENT
Start: 2023-09-02

## 2023-09-02 RX ADMIN — ONDANSETRON 4 MG: 4 TABLET, ORALLY DISINTEGRATING ORAL at 00:01

## 2023-09-02 NOTE — DISCHARGE INSTRUCTIONS
CT scans of your head, neck and face negative for fracture. You do have emerging hematoma because you are on the Eliquis. You do have a fracture of the proximal shaft right humerus. There is slight displacement. Sling will be placed. Do not use the arm. I do recommend stop Eliquis for at least the next 3 days. Apply ice to the fracture site. I did have correspondence with Dr. Laurie Marie on-call for orthopedics. I would like you to contact that office Monday for an appointment early next week. They may reach out to you to schedule an appointment. We also discussed the concept of compartment syndrome. Should you experience increasing pain in the site of injury, down the arm, tingling or numbness down the arm you need to return for reevaluation.

## 2023-09-02 NOTE — ED PROVIDER NOTES
[address] [facility] [address] [address]        Pt Name: Atilio Umanzor  MRN: [id number]  [address] [facility] 1960  Date of evaluation: 9/1/2023  Provider: Julian Ramon PA-C  PCP: Cristofer Erazo MD  Note Started: 9:44 PM EDT 9/1/23      AARON. I have evaluated this patient. CHIEF COMPLAINT       Chief Complaint   Patient presents with    Fall     Pt presents to the ED d/t fall; slipped on water and landed on her right shoulder, and right sd of her face. +blood thinners  Denies LOC        HISTORY OF PRESENT ILLNESS: 1 or more Elements     History From: Patient    Atilio Umanzor is a 58 y.o. female who presents to the emergency department with history of slip and fall. Patient owns a catering business and was putting some things when an outdoor refrigerator unit when there was condensation she slipped and fell landing on her right arm with sudden pain and swelling in the proximal third humerus as well as striking the left face/zygoma area. This injury event occurred at 6 PM this evening. No neck pain. Slight headache. She is on Eliquis 5 mg twice daily. Patient with history of atrial fibrillation and ablation procedure. Nursing Notes were all reviewed and agreed with or any disagreements were addressed in the HPI. REVIEW OF SYSTEMS :      Review of Systems    Positives and Pertinent negatives as per HPI.      SURGICAL HISTORY     Past Surgical History:   Procedure Laterality Date    CARDIAC CATHETERIZATION  2019    COLONOSCOPY N/A 2/7/2020    COLONOSCOPY WITH BIOPSY performed by Pilar Khan MD at [address]  2/25/2020    COLONOSCOPY POLYPECTOMY SNARE/COLD BIOPSY performed by Pilar Khan MD at [facility]  [id number]    adenoidectomy    TUBAL LIGATION      UPPER GASTROINTESTINAL ENDOSCOPY N/A 2/7/2020    EGD BIOPSY performed by Pilar Khan MD at [address] [facility]

## 2023-09-02 NOTE — ED NOTES
D/C: Order noted for d/c. Pt confirmed d/c paperwork  have correct name. Discharge and education instructions reviewed with patient. Teach-back successful. Pt verbalized understanding and signed d/c papers. Pt denied questions at this time. No acute distress noted. Patient instructed to follow-up as noted - return to emergency department if symptoms worsen. Patient verbalized understanding. Discharged per EDMD with discharge instructions. Pt discharged  to private vehicle. Patient stable upon departure. Thanked patient for choosing [facility]) for care.           Omer Lynne RN  09/02/23 0002

## 2023-09-07 ENCOUNTER — TELEPHONE (OUTPATIENT)
Dept: CARDIOLOGY CLINIC | Age: 63
End: 2023-09-07

## 2023-09-07 NOTE — TELEPHONE ENCOUNTER
Dr. Emily Horn,    Dr. Emi Mccollum at [facility] orthopaedic is requesting advisement on patient cardiac risk to [facility] open treatment of right humerus fracture on 9/13/23, anesthesia: regional block + general.    She was last seen in office on 01/16/2023 by Candyce Skiff, APRN - CNP for PAF, EKG showed SR.    s/p PVI, CAFE RFA on 7/8/2021   CHADS2-VASc 2 (gender, HTN) on Eliquis 5mg BID    Please advise,    Thanks,  Aniceto Hayden RN

## 2023-09-07 NOTE — TELEPHONE ENCOUNTER
Dr. Jonny Pacheco,     Patient last seen by [facility] on 1/16/23. She has a hx of Afib/flutter with an ablation for both Afib and L atrial flutter on 7/8/2021. Currently taking Eliquis 5 mg BID for which she is requesting to hold for 72 hours prior to procedure (Open treatment right humour's fracture) at [facility] on 9/13/23. Please advise further, thanks.

## 2023-09-07 NOTE — TELEPHONE ENCOUNTER
----- Message from ORTEGA Diane CNP sent at 9/7/2023 11:37 AM EDT -----  Regarding: FW: Cardiac Clearance  Please see cardiac clearance request below. Thanks,  Noris Morales    ----- Message -----  From: Tiffani Sharma RN  Sent: 9/7/2023  10:49 AM EDT  To: ORTEGA Diane CNP  Subject: Cardiac Clearance                                [place] is requesting cardiac clearance for upcoming surgery out our outpatient facility. Procedure: Open treatment of right humerus fracture  Procedure date: 9/13/23  Surgeon: Dr. Luis Lesly  Anesthesia: regional block + general  Surgeon fax number: [phone]  Surgeon phone number: [phone]   Requesting recommendations to hold [facility] preoperatively (please specify which blood thinner and length of time to be held preoperatively if they are on any)  #Also please fax most recent EKG tracing to [phone]. If you have any questions, please call [phone].

## 2023-09-07 NOTE — TELEPHONE ENCOUNTER
CARDIAC CLEARANCE     What type of procedure are you having? Open treatment right humour's fracture    Which physician is performing your procedure? Dr Nancy Perez     When is your procedure scheduled for? 09/13/2023    Where are you having this procedure? [place]    Are you taking Blood Thinners? yes    If so what? (Name/dose/frequesncy) Eliquis    Does the surgeon want you to stop your blood thinner?  yes    If so for how long? 72 hours     Phone Number and Contact Name for Physicians office:  Chelsea Rios [phone]    Fax number to send information:    [phone] Atten[de-identified] Chelsea Rios

## 2024-04-29 NOTE — TELEPHONE ENCOUNTER
Medication Refill    Medication needing refilled:  apixaban (ELIQUIS)     Dosage of the medication:  5 MG TABS tablet     How are you taking this medication (QD, BID, TID, QID, PRN):  Take 1 tablet by mouth 2 times daily     30 or 90 day supply called in:  90 day supply    When will you run out of your medication:    Which Pharmacy are we sending the medication to?:    [facility] - [address] -  [phone] - F [phone]

## 2024-05-06 ENCOUNTER — TELEPHONE (OUTPATIENT)
Dept: CARDIOLOGY CLINIC | Age: 64
End: 2024-05-06

## 2024-05-06 DIAGNOSIS — M79.89 RIGHT LEG SWELLING: ICD-10-CM

## 2024-05-06 DIAGNOSIS — I48.0 PAROXYSMAL ATRIAL FIBRILLATION (HCC): ICD-10-CM

## 2024-05-06 DIAGNOSIS — I10 ESSENTIAL HYPERTENSION: ICD-10-CM

## 2024-05-06 RX ORDER — HYDROCHLOROTHIAZIDE 25 MG/1
25 TABLET ORAL EVERY MORNING
Qty: 90 TABLET | Refills: 3 | Status: SHIPPED | OUTPATIENT
Start: 2024-05-06

## 2024-05-06 NOTE — TELEPHONE ENCOUNTER
LOV 1/16/23  NOV X yearly  Labs 7/8/21      Needs yearly follow up with Dr. Galindo, please schedule   Pt moved to [place] recently and he is asking if your clinic can accept him for INR checks as this is much closer to his house. Pt wants to keep Dr Butts as his PCP,  Dr Butts in agreement for pt to have his INR in your clinic, Dr Butts's number [phone], fax [phone] in case if needed. Order placed, please let me know if anything else is needed.   Thanks

## 2024-05-06 NOTE — TELEPHONE ENCOUNTER
Medication Refill    Medication needing refilled:  hydroCHLOROthiazide (HYDRODIURIL)     Dosage of the medication:  25 MG tablet     How are you taking this medication (QD, BID, TID, QID, PRN):  TAKE 1 TABLET EVERY MORNING     30 or 90 day supply called in:  90 tablet     Which Pharmacy are we sending the medication to?:    [facility] - [place], [place] - [address] [place] [address] -  [phone] - F [phone]  [address] [place] [address]  Phone: [phone]  Fax: [phone]

## 2024-05-14 ENCOUNTER — TELEPHONE (OUTPATIENT)
Dept: CARDIOLOGY CLINIC | Age: 64
End: 2024-05-14

## 2024-05-14 NOTE — TELEPHONE ENCOUNTER
Last OV: 2023 CHRISTEN HANSEN NP  Last Labs: 2021  Last Refills: 2024  Next Appt: 2024  Last EK2023    SAMPLES GIVEN  ELIQUIS 2.5MG 2 TABLET TWICE DAILY

## 2024-05-14 NOTE — TELEPHONE ENCOUNTER
Submitted PA for ELIQUIS  [facility] Key: [id number]  STATUS: 4/14/24-5/14/25    Follow up done daily; if no decision with in three days we will refax.  If another three days goes by with no decision will call the insurance for status.

## 2024-08-29 DIAGNOSIS — I48.0 PAROXYSMAL ATRIAL FIBRILLATION (HCC): ICD-10-CM

## 2024-08-29 DIAGNOSIS — M79.89 RIGHT LEG SWELLING: ICD-10-CM

## 2024-08-29 DIAGNOSIS — I10 ESSENTIAL HYPERTENSION: ICD-10-CM

## 2024-08-29 RX ORDER — HYDROCHLOROTHIAZIDE 25 MG/1
25 TABLET ORAL EVERY MORNING
Qty: 90 TABLET | Refills: 0 | Status: SHIPPED | OUTPATIENT
Start: 2024-08-29

## 2024-08-29 NOTE — TELEPHONE ENCOUNTER
Medication Refill    When was your last appointment with cardiology?    (If 1 yr or longer, please schedule appointment)    (If patient has been told they do not need to follow-up - medications should be filled by PCP)    1/16/2023, [place]    When did you last have labs drawn?     5/26/2022, general [facility] everywhere    Medication needing refilled?    HCTZ    Dosage of the medication?    25 mg    How are you taking this medication (QD, BID, TID, QID, PRN)?    1 PO AD in the morning.    Do you want a 30 or 90 day supply?    90-day supply    When will you run out of your medication?     Few days    Which Pharmacy are we sending this medication to?    [facility]    Pharmacy Phone: [phone]  Pharmacy Fax: [phone]

## 2024-09-23 ENCOUNTER — TELEPHONE (OUTPATIENT)
Dept: CARDIOLOGY CLINIC | Age: 64
End: 2024-09-23

## 2024-09-23 DIAGNOSIS — I10 ESSENTIAL HYPERTENSION: ICD-10-CM

## 2024-09-23 DIAGNOSIS — I48.0 PAROXYSMAL ATRIAL FIBRILLATION (HCC): ICD-10-CM

## 2024-09-23 DIAGNOSIS — M79.89 RIGHT LEG SWELLING: ICD-10-CM

## 2024-09-23 RX ORDER — HYDROCHLOROTHIAZIDE 25 MG/1
25 TABLET ORAL EVERY MORNING
Qty: 90 TABLET | Refills: 0 | Status: SHIPPED | OUTPATIENT
Start: 2024-09-23

## 2024-12-26 ENCOUNTER — TELEPHONE (OUTPATIENT)
Dept: CARDIOLOGY CLINIC | Age: 64
End: 2024-12-26

## 2024-12-26 DIAGNOSIS — I48.0 PAROXYSMAL ATRIAL FIBRILLATION (HCC): ICD-10-CM

## 2024-12-26 DIAGNOSIS — I10 ESSENTIAL HYPERTENSION: ICD-10-CM

## 2024-12-26 DIAGNOSIS — M79.89 RIGHT LEG SWELLING: ICD-10-CM

## 2024-12-26 RX ORDER — HYDROCHLOROTHIAZIDE 25 MG/1
25 TABLET ORAL EVERY MORNING
Qty: 90 TABLET | Refills: 0 | OUTPATIENT
Start: 2024-12-26

## 2024-12-26 NOTE — TELEPHONE ENCOUNTER
Spoke to PT and advised she will need to reach out to her PCP as she is the one who manages her BP. PT V/U.

## 2024-12-26 NOTE — TELEPHONE ENCOUNTER
Please reach out to patient and instruct to contact PCP Roxie Dumont MD for further refills of her hydrochlorothiazide.  Medications was previously prescribed by ORTEGA Thompson, but she will seeing Stephane Galindo MD and he will defer to her PCP Roxie Dumont MD for management of her blood pressure and prescribing on the hydrochlorothiazide.

## 2025-04-11 ENCOUNTER — TELEPHONE (OUTPATIENT)
Dept: CARDIOLOGY CLINIC | Age: 65
End: 2025-04-11

## 2025-04-11 NOTE — TELEPHONE ENCOUNTER
Called PT, continuous ring and no answer. Will try again. PT's Rx insurance may not cover another 30 days if supply that is being processed through [facility] has billed them also. PT should check the status of her medication through [facility] before paying cash with [facility]. This medication can be quite expensive.

## 2025-04-11 NOTE — TELEPHONE ENCOUNTER
Patient relayed that she is waiting on [facility] order. Was informed it was in transit as of 4/1, 10 days later patient does not have.      Medication Refill    When was your last appointment with cardiology?    (If 1 yr or longer, please schedule appointment)    (If patient has been told they do not need to follow-up - medications should be filled by PCP)    When did you last have labs drawn?     Medication needing refilled?  apixaban (ELIQUIS)     Dosage of the medication?  5 MG TABS tablet     How are you taking this medication (QD, BID, TID, QID, PRN)?  Take 1 tablet by mouth 2 times daily     Do you want a 30 or 90 day supply?  30    When will you run out of your medication?   Currently out    Which Pharmacy are we sending this medication to?  [id number] - [place], [place] - [address]   Pharmacy Phone:[phone]   Pharmacy Fax:[phone]

## 2025-04-14 RX ORDER — APIXABAN 5 MG/1
5 TABLET, FILM COATED ORAL 2 TIMES DAILY
Qty: 180 TABLET | Refills: 3 | Status: SHIPPED | OUTPATIENT
Start: 2025-04-14 | End: 2025-04-14 | Stop reason: SDUPTHER

## 2025-04-14 NOTE — TELEPHONE ENCOUNTER
Stephanie calling back stating she talked to [facility] and they told her to have our office send a short 30 day script so they can rush the medication out and they will adjust the pricing.      Stephanie is completely out of this medication

## 2025-04-14 NOTE — TELEPHONE ENCOUNTER
Left message for pt to return call. Please ask pt if she has received her Eliquis from [employer].

## 2025-04-14 NOTE — TELEPHONE ENCOUNTER
Stephanie called to inquire on status. She is all out of this medication as of today. She states she las spoke with [facility] yesterday and they did not give her clear information on where her medication is. Stephanie states they are telling her it's a postal delay. She was able to look at some text messages that Jun is stating estimate delivery was supposed to be on 4/6 but she still has not received.     Please assist.     Stephanie's callback: [phone]

## 2025-05-05 ENCOUNTER — TELEPHONE (OUTPATIENT)
Dept: CARDIOLOGY CLINIC | Age: 65
End: 2025-05-05

## 2025-05-05 NOTE — TELEPHONE ENCOUNTER
Patient had an appt with Dr. Galindo 5/7.   She needs to r/s but did not want to wait until next available in August.     Please advise and assist with scheduling.     Callback: [phone]

## (undated) DEVICE — FORCEPS BX L240CM WRK CHN 2.8MM STD CAP W/ NDL MIC MESH

## (undated) DEVICE — FORCEPS BX L240CM JAW DIA2.8MM L CAP W/ NDL MIC MESH TOOTH

## (undated) DEVICE — FORCEPS BX L240CM JAW DIA2.4MM ORNG L CAP W/ NDL DISP RAD

## (undated) DEVICE — TRAP SPEC RETRV CLR PLAS POLYP IN LN SUCT QUIK CTCH